# Patient Record
Sex: FEMALE | Race: OTHER | NOT HISPANIC OR LATINO | URBAN - METROPOLITAN AREA
[De-identification: names, ages, dates, MRNs, and addresses within clinical notes are randomized per-mention and may not be internally consistent; named-entity substitution may affect disease eponyms.]

---

## 2019-12-11 ENCOUNTER — EMERGENCY (EMERGENCY)
Facility: HOSPITAL | Age: 29
LOS: 1 days | Discharge: HOME | End: 2019-12-11
Attending: EMERGENCY MEDICINE
Payer: COMMERCIAL

## 2019-12-11 VITALS
HEART RATE: 120 BPM | SYSTOLIC BLOOD PRESSURE: 125 MMHG | DIASTOLIC BLOOD PRESSURE: 82 MMHG | OXYGEN SATURATION: 100 % | TEMPERATURE: 99 F | RESPIRATION RATE: 20 BRPM | HEIGHT: 59 IN | WEIGHT: 164.91 LBS

## 2019-12-11 VITALS
DIASTOLIC BLOOD PRESSURE: 72 MMHG | HEART RATE: 94 BPM | OXYGEN SATURATION: 97 % | SYSTOLIC BLOOD PRESSURE: 120 MMHG | RESPIRATION RATE: 20 BRPM | TEMPERATURE: 98 F

## 2019-12-11 LAB
ALBUMIN SERPL ELPH-MCNC: 3.1 G/DL — LOW (ref 3.5–5)
ALP SERPL-CCNC: 237 U/L — HIGH (ref 40–120)
ALT FLD-CCNC: 389 U/L DA — HIGH (ref 10–60)
ANION GAP SERPL CALC-SCNC: 6 MMOL/L — SIGNIFICANT CHANGE UP (ref 5–17)
APPEARANCE UR: CLEAR — SIGNIFICANT CHANGE UP
AST SERPL-CCNC: 183 U/L — HIGH (ref 10–40)
BILIRUB SERPL-MCNC: 0.5 MG/DL — SIGNIFICANT CHANGE UP (ref 0.2–1.2)
BILIRUB UR-MCNC: NEGATIVE — SIGNIFICANT CHANGE UP
BUN SERPL-MCNC: 3 MG/DL — LOW (ref 7–18)
CALCIUM SERPL-MCNC: 8.5 MG/DL — SIGNIFICANT CHANGE UP (ref 8.4–10.5)
CHLORIDE SERPL-SCNC: 101 MMOL/L — SIGNIFICANT CHANGE UP (ref 96–108)
CO2 SERPL-SCNC: 24 MMOL/L — SIGNIFICANT CHANGE UP (ref 22–31)
COLOR SPEC: YELLOW — SIGNIFICANT CHANGE UP
CREAT SERPL-MCNC: 0.58 MG/DL — SIGNIFICANT CHANGE UP (ref 0.5–1.3)
DIFF PNL FLD: ABNORMAL
FLU A RESULT: SIGNIFICANT CHANGE UP
FLU A RESULT: SIGNIFICANT CHANGE UP
FLUAV AG NPH QL: SIGNIFICANT CHANGE UP
FLUBV AG NPH QL: SIGNIFICANT CHANGE UP
GLUCOSE SERPL-MCNC: 98 MG/DL — SIGNIFICANT CHANGE UP (ref 70–99)
GLUCOSE UR QL: NEGATIVE — SIGNIFICANT CHANGE UP
HCG SERPL-ACNC: HIGH MIU/ML
HCT VFR BLD CALC: 39.4 % — SIGNIFICANT CHANGE UP (ref 34.5–45)
HGB BLD-MCNC: 12.5 G/DL — SIGNIFICANT CHANGE UP (ref 11.5–15.5)
KETONES UR-MCNC: NEGATIVE — SIGNIFICANT CHANGE UP
LEUKOCYTE ESTERASE UR-ACNC: ABNORMAL
MCHC RBC-ENTMCNC: 24.6 PG — LOW (ref 27–34)
MCHC RBC-ENTMCNC: 31.7 GM/DL — LOW (ref 32–36)
MCV RBC AUTO: 77.4 FL — LOW (ref 80–100)
NITRITE UR-MCNC: NEGATIVE — SIGNIFICANT CHANGE UP
NRBC # BLD: 0 /100 WBCS — SIGNIFICANT CHANGE UP (ref 0–0)
PH UR: 7 — SIGNIFICANT CHANGE UP (ref 5–8)
PLATELET # BLD AUTO: 234 K/UL — SIGNIFICANT CHANGE UP (ref 150–400)
POTASSIUM SERPL-MCNC: 3.8 MMOL/L — SIGNIFICANT CHANGE UP (ref 3.5–5.3)
POTASSIUM SERPL-SCNC: 3.8 MMOL/L — SIGNIFICANT CHANGE UP (ref 3.5–5.3)
PROT SERPL-MCNC: 7.8 G/DL — SIGNIFICANT CHANGE UP (ref 6–8.3)
PROT UR-MCNC: NEGATIVE — SIGNIFICANT CHANGE UP
RBC # BLD: 5.09 M/UL — SIGNIFICANT CHANGE UP (ref 3.8–5.2)
RBC # FLD: 15.1 % — HIGH (ref 10.3–14.5)
RSV RESULT: SIGNIFICANT CHANGE UP
RSV RNA RESP QL NAA+PROBE: SIGNIFICANT CHANGE UP
SODIUM SERPL-SCNC: 131 MMOL/L — LOW (ref 135–145)
SP GR SPEC: 1 — LOW (ref 1.01–1.02)
UROBILINOGEN FLD QL: NEGATIVE — SIGNIFICANT CHANGE UP
WBC # BLD: 8.04 K/UL — SIGNIFICANT CHANGE UP (ref 3.8–10.5)
WBC # FLD AUTO: 8.04 K/UL — SIGNIFICANT CHANGE UP (ref 3.8–10.5)

## 2019-12-11 PROCEDURE — 96374 THER/PROPH/DIAG INJ IV PUSH: CPT

## 2019-12-11 PROCEDURE — 99284 EMERGENCY DEPT VISIT MOD MDM: CPT | Mod: 25

## 2019-12-11 PROCEDURE — 76801 OB US < 14 WKS SINGLE FETUS: CPT

## 2019-12-11 PROCEDURE — 85027 COMPLETE CBC AUTOMATED: CPT

## 2019-12-11 PROCEDURE — 84702 CHORIONIC GONADOTROPIN TEST: CPT

## 2019-12-11 PROCEDURE — 99284 EMERGENCY DEPT VISIT MOD MDM: CPT

## 2019-12-11 PROCEDURE — 81001 URINALYSIS AUTO W/SCOPE: CPT

## 2019-12-11 PROCEDURE — 76801 OB US < 14 WKS SINGLE FETUS: CPT | Mod: 26

## 2019-12-11 PROCEDURE — 80053 COMPREHEN METABOLIC PANEL: CPT

## 2019-12-11 PROCEDURE — 87631 RESP VIRUS 3-5 TARGETS: CPT

## 2019-12-11 PROCEDURE — 36415 COLL VENOUS BLD VENIPUNCTURE: CPT

## 2019-12-11 RX ORDER — ACETAMINOPHEN 500 MG
975 TABLET ORAL ONCE
Refills: 0 | Status: COMPLETED | OUTPATIENT
Start: 2019-12-11 | End: 2019-12-11

## 2019-12-11 RX ORDER — METOCLOPRAMIDE HCL 10 MG
10 TABLET ORAL ONCE
Refills: 0 | Status: COMPLETED | OUTPATIENT
Start: 2019-12-11 | End: 2019-12-11

## 2019-12-11 RX ORDER — IBUPROFEN 200 MG
800 TABLET ORAL ONCE
Refills: 0 | Status: DISCONTINUED | OUTPATIENT
Start: 2019-12-11 | End: 2019-12-11

## 2019-12-11 RX ORDER — NITROFURANTOIN MACROCRYSTAL 50 MG
1 CAPSULE ORAL
Qty: 14 | Refills: 0
Start: 2019-12-11

## 2019-12-11 RX ORDER — SODIUM CHLORIDE 9 MG/ML
1000 INJECTION INTRAMUSCULAR; INTRAVENOUS; SUBCUTANEOUS ONCE
Refills: 0 | Status: COMPLETED | OUTPATIENT
Start: 2019-12-11 | End: 2019-12-11

## 2019-12-11 RX ADMIN — Medication 10 MILLIGRAM(S): at 14:53

## 2019-12-11 RX ADMIN — SODIUM CHLORIDE 1000 MILLILITER(S): 9 INJECTION INTRAMUSCULAR; INTRAVENOUS; SUBCUTANEOUS at 17:26

## 2019-12-11 RX ADMIN — Medication 975 MILLIGRAM(S): at 17:25

## 2019-12-11 RX ADMIN — SODIUM CHLORIDE 1000 MILLILITER(S): 9 INJECTION INTRAMUSCULAR; INTRAVENOUS; SUBCUTANEOUS at 14:52

## 2019-12-11 NOTE — ED PROVIDER NOTE - PHYSICAL EXAMINATION
General: pt lying in stretcher, appears stated age and is not in distress  HEENT: AT/NC, pink conjunctiva, anicteric sclerae, EOMI, PERRLA, TMs smooth, grey, intact, with normal landmarks, nasal mucosa pink, no discharge, turbinates not enlarged; moist mucus membranes, tongue well-papillated, good dentition; posterior pharynx shows no erythema or exudates;   Neck: supple, full ROM, trachea midline, no JVD, no cervical LAD, no midline ttp or stepoffs  Lungs: symmetric excursion, b/l clear vesicular breath sounds with no wheezes, crackles, or rhonchi  Heart: rrr, S1, S2 normal; no S3 or S4; no murmurs or rubs  Abd: normal bowel sounds; soft, nontender; negative McBurney's point tenderness, negative Abreu's sign, no rebound, no guarding, spleen non-palpable; no hepatomegaly, no masses  Back: no midline spinal tenderness or stepoffs, no costovertebral angle tenderness  Extremities: no clubbing, cyanosis, or edema; no palpable deformities or fractures  Skin: good turgor; no rashes, petechiae, ecchymoses, or jaundice  Pulses: radial, posterior tibialis (PT), dorsalis pedis (DP) all 2+ & symmetric  Neuro: awake, alert, responsive; oriented to person, place and time; cranial nerves intact, EOMI, intact jaw movement, intact facial sensation, no facial asymmetry, hearing intact; no nystagmus, tongue midline; Motor: Normal tone in upper and lower extremities bilaterally strength 5/5; Sensory: intact to pinprick and light touch; Cerebellar: finger-to-nose intact; normal steady gait; negative Romberg’s sign; DTR: biceps, triceps, patellar, 2+, no pronator drift General: pt lying in stretcher, appears stated age and is not in distress, speaking in full clear sentences  HEENT: AT/NC, pink conjunctiva, anicteric sclerae, EOMI, PERRLA, moist mucus membranes, tongue well-papillated, good dentition; posterior pharynx shows no erythema or exudates;   Neck: supple, full ROM, trachea midline, no JVD, no cervical LAD, no midline ttp or stepoffs  Lungs: symmetric excursion, b/l clear vesicular breath sounds with no wheezes, crackles, or rhonchi  Heart: rrr, S1, S2 normal; no S3 or S4; no murmurs or rubs  Abd: normal bowel sounds; soft, nontender; negative McBurney's point tenderness, negative Abreu's sign, no rebound, no guarding, spleen non-palpable; no hepatomegaly, no masses  Back: no midline spinal tenderness or stepoffs, no costovertebral angle tenderness  Extremities: no clubbing, cyanosis, or edema; no palpable deformities or fractures  Skin: good turgor; no rashes, petechiae, ecchymoses, or jaundice  Pulses: radial, posterior tibialis (PT), dorsalis pedis (DP) all 2+ & symmetric  Neuro: awake, alert, responsive; oriented to person, place and time; cranial nerves intact, EOMI, intact jaw movement, intact facial sensation, no facial asymmetry, hearing intact; no nystagmus, tongue midline; Motor: Normal tone in upper and lower extremities bilaterally strength 5/5; Sensory: intact; normal steady gait

## 2019-12-11 NOTE — ED PROVIDER NOTE - PATIENT PORTAL LINK FT
You can access the FollowMyHealth Patient Portal offered by [facility] by registering at the following website: [email or web address]. By joining [employer]s FollowMyHealth portal, you will also be able to view your health information using other applications (apps) compatible with our system.

## 2019-12-11 NOTE — ED PROVIDER NOTE - CLINICAL SUMMARY MEDICAL DECISION MAKING FREE TEXT BOX
Pt w/ aforementioned presentation afebrile in the ED (last tylenol dose was yesterday) concerning for but not limited to viral illness, UTI other infection; lungs clear to auscultation. No neuro deficits or nuchal rigidity to suggest meningitis. Abd presently w/ no ttp and no abdominal pain. Will get labs, US, give IVF, monitor and reassess.   Pt improved w/ meds and IVF. Has positive UA but no CVA tenderness to suggest pyelonephritis or nephrolithiasis. flu negative. Will dc home w/ instructions to follow up w/ PMD. normal IUP on US.

## 2019-12-11 NOTE — ED PROVIDER NOTE - OBJECTIVE STATEMENT
30 y/o F with a PMHx of Migraines and no PSHx presents to ED 8 weeks pregnant. Pt is complaining of migraines with intermittent fevers that get worse at night. Pt went to PCP was told likely viral illness, negative flu. Pt took Tylenol at 5am x today. Today, new onset of abd pain. Denies neck stiffness/focal weakness/numbness. Headache has improved. Pt has no other     complaints. NKDA. 28 y/o F with a PMHx of Migraines and no PSHx presents to ED  (1 ) [place] 8 weeks pregnant. Pt is complaining of her usual migraine with intermittent fevers that get worse at night. Pt went to PCP was told likely viral illness, negative flu. Pt took Tylenol at 5am x today. Today, new onset of low abd pain at home but spontaneously resolved and no pain presently in the ED. Denies vaginal bleeding, neck stiffness/focal weakness/numbness. Headache has improved. Pt has no other complaints. NKDA.

## 2019-12-23 PROBLEM — G43.909 MIGRAINE, UNSPECIFIED, NOT INTRACTABLE, WITHOUT STATUS MIGRAINOSUS: Chronic | Status: ACTIVE | Noted: 2019-12-11

## 2020-01-08 ENCOUNTER — APPOINTMENT (OUTPATIENT)
Dept: ANTEPARTUM | Facility: CLINIC | Age: 30
End: 2020-01-08
Payer: COMMERCIAL

## 2020-01-08 ENCOUNTER — ASOB RESULT (OUTPATIENT)
Age: 30
End: 2020-01-08

## 2020-01-08 PROBLEM — Z00.00 ENCOUNTER FOR PREVENTIVE HEALTH EXAMINATION: Status: ACTIVE | Noted: 2020-01-08

## 2020-01-08 PROCEDURE — 76813 OB US NUCHAL MEAS 1 GEST: CPT

## 2020-01-08 PROCEDURE — 76801 OB US < 14 WKS SINGLE FETUS: CPT

## 2020-01-08 PROCEDURE — 36416 COLLJ CAPILLARY BLOOD SPEC: CPT

## 2020-01-28 ENCOUNTER — EMERGENCY (EMERGENCY)
Facility: HOSPITAL | Age: 30
LOS: 1 days | Discharge: HOME | End: 2020-01-28
Attending: EMERGENCY MEDICINE
Payer: COMMERCIAL

## 2020-01-28 VITALS
OXYGEN SATURATION: 100 % | HEART RATE: 72 BPM | TEMPERATURE: 97 F | SYSTOLIC BLOOD PRESSURE: 112 MMHG | RESPIRATION RATE: 18 BRPM | DIASTOLIC BLOOD PRESSURE: 70 MMHG

## 2020-01-28 VITALS
WEIGHT: 164.91 LBS | SYSTOLIC BLOOD PRESSURE: 117 MMHG | HEIGHT: 59 IN | DIASTOLIC BLOOD PRESSURE: 71 MMHG | OXYGEN SATURATION: 100 % | HEART RATE: 70 BPM | RESPIRATION RATE: 16 BRPM | TEMPERATURE: 98 F

## 2020-01-28 LAB
ALBUMIN SERPL ELPH-MCNC: 3.2 G/DL — LOW (ref 3.5–5)
ALP SERPL-CCNC: 92 U/L — SIGNIFICANT CHANGE UP (ref 40–120)
ALT FLD-CCNC: 36 U/L DA — SIGNIFICANT CHANGE UP (ref 10–60)
ANION GAP SERPL CALC-SCNC: 6 MMOL/L — SIGNIFICANT CHANGE UP (ref 5–17)
APPEARANCE UR: CLEAR — SIGNIFICANT CHANGE UP
AST SERPL-CCNC: 14 U/L — SIGNIFICANT CHANGE UP (ref 10–40)
BASOPHILS # BLD AUTO: 0.02 K/UL — SIGNIFICANT CHANGE UP (ref 0–0.2)
BASOPHILS NFR BLD AUTO: 0.2 % — SIGNIFICANT CHANGE UP (ref 0–2)
BILIRUB SERPL-MCNC: 0.4 MG/DL — SIGNIFICANT CHANGE UP (ref 0.2–1.2)
BILIRUB UR-MCNC: NEGATIVE — SIGNIFICANT CHANGE UP
BUN SERPL-MCNC: 7 MG/DL — SIGNIFICANT CHANGE UP (ref 7–18)
CALCIUM SERPL-MCNC: 8.8 MG/DL — SIGNIFICANT CHANGE UP (ref 8.4–10.5)
CHLORIDE SERPL-SCNC: 103 MMOL/L — SIGNIFICANT CHANGE UP (ref 96–108)
CO2 SERPL-SCNC: 26 MMOL/L — SIGNIFICANT CHANGE UP (ref 22–31)
COLOR SPEC: YELLOW — SIGNIFICANT CHANGE UP
CREAT SERPL-MCNC: 0.5 MG/DL — SIGNIFICANT CHANGE UP (ref 0.5–1.3)
DIFF PNL FLD: NEGATIVE — SIGNIFICANT CHANGE UP
EOSINOPHIL # BLD AUTO: 0.14 K/UL — SIGNIFICANT CHANGE UP (ref 0–0.5)
EOSINOPHIL NFR BLD AUTO: 1.7 % — SIGNIFICANT CHANGE UP (ref 0–6)
GLUCOSE SERPL-MCNC: 93 MG/DL — SIGNIFICANT CHANGE UP (ref 70–99)
GLUCOSE UR QL: NEGATIVE — SIGNIFICANT CHANGE UP
HCT VFR BLD CALC: 34.8 % — SIGNIFICANT CHANGE UP (ref 34.5–45)
HGB BLD-MCNC: 11.2 G/DL — LOW (ref 11.5–15.5)
IMM GRANULOCYTES NFR BLD AUTO: 0.6 % — SIGNIFICANT CHANGE UP (ref 0–1.5)
KETONES UR-MCNC: NEGATIVE — SIGNIFICANT CHANGE UP
LEUKOCYTE ESTERASE UR-ACNC: ABNORMAL
LIDOCAIN IGE QN: 110 U/L — SIGNIFICANT CHANGE UP (ref 73–393)
LYMPHOCYTES # BLD AUTO: 2.35 K/UL — SIGNIFICANT CHANGE UP (ref 1–3.3)
LYMPHOCYTES # BLD AUTO: 28.2 % — SIGNIFICANT CHANGE UP (ref 13–44)
MCHC RBC-ENTMCNC: 25.8 PG — LOW (ref 27–34)
MCHC RBC-ENTMCNC: 32.2 GM/DL — SIGNIFICANT CHANGE UP (ref 32–36)
MCV RBC AUTO: 80.2 FL — SIGNIFICANT CHANGE UP (ref 80–100)
MONOCYTES # BLD AUTO: 0.62 K/UL — SIGNIFICANT CHANGE UP (ref 0–0.9)
MONOCYTES NFR BLD AUTO: 7.5 % — SIGNIFICANT CHANGE UP (ref 2–14)
NEUTROPHILS # BLD AUTO: 5.14 K/UL — SIGNIFICANT CHANGE UP (ref 1.8–7.4)
NEUTROPHILS NFR BLD AUTO: 61.8 % — SIGNIFICANT CHANGE UP (ref 43–77)
NITRITE UR-MCNC: NEGATIVE — SIGNIFICANT CHANGE UP
NRBC # BLD: 0 /100 WBCS — SIGNIFICANT CHANGE UP (ref 0–0)
PH UR: 6.5 — SIGNIFICANT CHANGE UP (ref 5–8)
PLATELET # BLD AUTO: 290 K/UL — SIGNIFICANT CHANGE UP (ref 150–400)
POTASSIUM SERPL-MCNC: 3.8 MMOL/L — SIGNIFICANT CHANGE UP (ref 3.5–5.3)
POTASSIUM SERPL-SCNC: 3.8 MMOL/L — SIGNIFICANT CHANGE UP (ref 3.5–5.3)
PROT SERPL-MCNC: 7.6 G/DL — SIGNIFICANT CHANGE UP (ref 6–8.3)
PROT UR-MCNC: NEGATIVE — SIGNIFICANT CHANGE UP
RBC # BLD: 4.34 M/UL — SIGNIFICANT CHANGE UP (ref 3.8–5.2)
RBC # FLD: 15.1 % — HIGH (ref 10.3–14.5)
SODIUM SERPL-SCNC: 135 MMOL/L — SIGNIFICANT CHANGE UP (ref 135–145)
SP GR SPEC: 1.01 — SIGNIFICANT CHANGE UP (ref 1.01–1.02)
UROBILINOGEN FLD QL: NEGATIVE — SIGNIFICANT CHANGE UP
WBC # BLD: 8.32 K/UL — SIGNIFICANT CHANGE UP (ref 3.8–10.5)
WBC # FLD AUTO: 8.32 K/UL — SIGNIFICANT CHANGE UP (ref 3.8–10.5)

## 2020-01-28 PROCEDURE — 85027 COMPLETE CBC AUTOMATED: CPT

## 2020-01-28 PROCEDURE — 99284 EMERGENCY DEPT VISIT MOD MDM: CPT | Mod: 25

## 2020-01-28 PROCEDURE — 10060 I&D ABSCESS SIMPLE/SINGLE: CPT

## 2020-01-28 PROCEDURE — 96374 THER/PROPH/DIAG INJ IV PUSH: CPT | Mod: XU

## 2020-01-28 PROCEDURE — 80053 COMPREHEN METABOLIC PANEL: CPT

## 2020-01-28 PROCEDURE — 36415 COLL VENOUS BLD VENIPUNCTURE: CPT

## 2020-01-28 PROCEDURE — 99284 EMERGENCY DEPT VISIT MOD MDM: CPT

## 2020-01-28 PROCEDURE — 83690 ASSAY OF LIPASE: CPT

## 2020-01-28 PROCEDURE — 81001 URINALYSIS AUTO W/SCOPE: CPT

## 2020-01-28 PROCEDURE — 87086 URINE CULTURE/COLONY COUNT: CPT

## 2020-01-28 RX ORDER — CEPHALEXIN 500 MG
1 CAPSULE ORAL
Qty: 14 | Refills: 0
Start: 2020-01-28 | End: 2020-02-03

## 2020-01-28 RX ORDER — SODIUM CHLORIDE 9 MG/ML
1000 INJECTION INTRAMUSCULAR; INTRAVENOUS; SUBCUTANEOUS ONCE
Refills: 0 | Status: COMPLETED | OUTPATIENT
Start: 2020-01-28 | End: 2020-01-28

## 2020-01-28 RX ORDER — ONDANSETRON 8 MG/1
4 TABLET, FILM COATED ORAL ONCE
Refills: 0 | Status: COMPLETED | OUTPATIENT
Start: 2020-01-28 | End: 2020-01-28

## 2020-01-28 RX ADMIN — SODIUM CHLORIDE 2000 MILLILITER(S): 9 INJECTION INTRAMUSCULAR; INTRAVENOUS; SUBCUTANEOUS at 16:27

## 2020-01-28 RX ADMIN — ONDANSETRON 4 MILLIGRAM(S): 8 TABLET, FILM COATED ORAL at 16:27

## 2020-01-28 NOTE — ED PROVIDER NOTE - OBJECTIVE STATEMENT
28 y/o F pt who is A1 15 weeks pregnant with PMHx of UTI and pyelo while pregnant (previous pregnancy), and migraines, and no significant PSHx presents to ED c/o vomiting since yesterday, L armpit abscess x 3 days, and intermittent R lower back pain. Per pt, she's vomited twice yesterday afternoon, did not vomit last night, and vomited 4x this morning, and has been unable to keep anything down. Pt relates having dizziness and headache with her vomiting, a low grade fever of 100.7 last night that's resolved, being unable to sleep, and having frequent urination that looks cloudy. Pt states she went to her OB earlier today who told her to come to ED because of concerns for appendicitis, cholecystitis, and pyelonephritis. Pt attributes back pain to muscular strain stating she works on her feet all day. Pt is on pre- meds and reports her last sono was at 12weeks gestation and everything looked normal. Pt denies abdominal pain, hematuria, burning urination, vaginal bleeding, and any other acute complaints.

## 2020-01-28 NOTE — ED PROVIDER NOTE - CARE PLAN
no Principal Discharge DX:	Abscess of axilla, left  Secondary Diagnosis:	Asymptomatic bacteriuria during pregnancy

## 2020-01-28 NOTE — ED ADULT NURSE NOTE - OBJECTIVE STATEMENT
pt is a 28 y/o  female w/ c/o back pain  states she is 15 weeks pregnancy  and w/ history of   UTI. + vomiting since yesterday  and w/ lt armpit abscess.

## 2020-01-28 NOTE — ED PROVIDER NOTE - CLINICAL SUMMARY MEDICAL DECISION MAKING FREE TEXT BOX
30 y/o F pt who is 15 weeks in gestation presents to ED with L axilla abscess, multiple episodes of vomiting, and lower back pain. Concern for UTI, not clinically likely to be appendicitis. Not likely to be acute cholecystitis given no pain at this time, discussed if pain redevelops that there may be need to reconsider imaging. Will order blood work, give Zofran, and PO challenge.

## 2020-01-28 NOTE — ED ADULT TRIAGE NOTE - CHIEF COMPLAINT QUOTE
c/O 15 WEEKS PREGNANT SENT BY GYN FOR N/V, RIGHT LOWER BACK PAIN/ON/OFF R/O PYELONEPHRITIS, CHOLECYSTITIS, APPENDICITIS PER PAPERWORK WITH PT. Pt reports " I also have an abscess in my left arm pit".

## 2020-01-28 NOTE — ED ADULT NURSE REASSESSMENT NOTE - NS ED NURSE REASSESS COMMENT FT1
1900 - pt completed  incision and drainage done by Md Vasquez. pt tolerated procedure well.
1630 pm - pt felt better after the medication . no active nausea and vomiting in the ED intake area.

## 2020-01-28 NOTE — ED PROVIDER NOTE - PATIENT PORTAL LINK FT
You can access the FollowMyHealth Patient Portal offered by [facility] by registering at the following website: [email or web address]. By joining [employer]’s FollowMyHealth portal, you will also be able to view your health information using other applications (apps) compatible with our system.

## 2020-01-28 NOTE — ED PROVIDER NOTE - NSFOLLOWUPINSTRUCTIONS_ED_ALL_ED_FT
Follow up with the primary care doctor in 1-2 days.    Follow up with the OBGYN within 1 week.    Warm compress to L armpit. When you shower remove the dressing and wash with soap and water. Cover the area until crusts over and there is no leakage anymore.    You have been given the prescription for the antibiotic KEFLEX (Cephalexin). Side effects of the medication may include: abdominal pain, diarrhea, rash, yeast infection (in women). If you have any concern about the side effects of the medication speak with your doctor.     If you experience any new or worsening symptoms or if you are concerned you can always come back to the emergency for a re-evaluation.    If there were any prescriptions given to you during the visit today take them as prescribed. If you have any questions you can ask the pharmacist.

## 2020-01-28 NOTE — ED PROVIDER NOTE - PROGRESS NOTE DETAILS
UA shows bacteria. Will give Keflex to cover urine infection and skin mirna re the abscess. Will need to follow up with PMD in 1-2 days and OBGYN within 1 week. Pt is well appearing walking with steady gait, stable for discharge and follow up without fail with medical doctor. I had a detailed discussion with the patient and/or guardian regarding the historical points, exam findings, and any diagnostic results supporting the discharge diagnosis. Pt educated on care and need for follow up. Strict return instructions and red flag signs and symptoms discussed with patient. Questions answered. Pt shows understanding of discharge information and agrees to follow. did not have any abdominal pain / ttp on dc/ jayson meal tray. not vomiting.

## 2020-01-29 LAB
CULTURE RESULTS: SIGNIFICANT CHANGE UP
SPECIMEN SOURCE: SIGNIFICANT CHANGE UP

## 2020-03-02 ENCOUNTER — APPOINTMENT (OUTPATIENT)
Dept: ANTEPARTUM | Facility: CLINIC | Age: 30
End: 2020-03-02
Payer: MEDICAID

## 2020-03-02 ENCOUNTER — ASOB RESULT (OUTPATIENT)
Age: 30
End: 2020-03-02

## 2020-03-02 PROCEDURE — 76811 OB US DETAILED SNGL FETUS: CPT

## 2020-03-02 PROCEDURE — 76817 TRANSVAGINAL US OBSTETRIC: CPT

## 2020-04-01 PROBLEM — O23.00: Chronic | Status: ACTIVE | Noted: 2020-01-28

## 2020-04-01 PROBLEM — O23.40 UNSPECIFIED INFECTION OF URINARY TRACT IN PREGNANCY, UNSPECIFIED TRIMESTER: Chronic | Status: ACTIVE | Noted: 2020-01-28

## 2020-04-27 ENCOUNTER — ASOB RESULT (OUTPATIENT)
Age: 30
End: 2020-04-27

## 2020-04-27 ENCOUNTER — APPOINTMENT (OUTPATIENT)
Dept: ANTEPARTUM | Facility: CLINIC | Age: 30
End: 2020-04-27
Payer: MEDICAID

## 2020-04-27 PROCEDURE — 76816 OB US FOLLOW-UP PER FETUS: CPT

## 2020-06-17 ENCOUNTER — ASOB RESULT (OUTPATIENT)
Age: 30
End: 2020-06-17

## 2020-06-17 ENCOUNTER — APPOINTMENT (OUTPATIENT)
Dept: ANTEPARTUM | Facility: CLINIC | Age: 30
End: 2020-06-17
Payer: MEDICAID

## 2020-06-17 PROCEDURE — 76816 OB US FOLLOW-UP PER FETUS: CPT

## 2020-07-14 ENCOUNTER — INPATIENT (INPATIENT)
Facility: HOSPITAL | Age: 30
LOS: 1 days | Discharge: HOME | End: 2020-07-16
Attending: OBSTETRICS & GYNECOLOGY | Admitting: OBSTETRICS & GYNECOLOGY
Payer: MEDICAID

## 2020-07-14 ENCOUNTER — RESULT REVIEW (OUTPATIENT)
Age: 30
End: 2020-07-14

## 2020-07-14 VITALS — HEIGHT: 59 IN | WEIGHT: 180.78 LBS

## 2020-07-14 DIAGNOSIS — Z3A.00 WEEKS OF GESTATION OF PREGNANCY NOT SPECIFIED: ICD-10-CM

## 2020-07-14 DIAGNOSIS — Z34.80 ENCOUNTER FOR SUPERVISION OF OTHER NORMAL PREGNANCY, UNSPECIFIED TRIMESTER: ICD-10-CM

## 2020-07-14 DIAGNOSIS — O26.899 OTHER SPECIFIED PREGNANCY RELATED CONDITIONS, UNSPECIFIED TRIMESTER: ICD-10-CM

## 2020-07-14 LAB
APTT BLD: 32.2 SEC — SIGNIFICANT CHANGE UP (ref 27.5–35.5)
BASOPHILS # BLD AUTO: 0.02 K/UL — SIGNIFICANT CHANGE UP (ref 0–0.2)
BASOPHILS NFR BLD AUTO: 0.2 % — SIGNIFICANT CHANGE UP (ref 0–2)
BLD GP AB SCN SERPL QL: SIGNIFICANT CHANGE UP
EOSINOPHIL # BLD AUTO: 0.06 K/UL — SIGNIFICANT CHANGE UP (ref 0–0.5)
EOSINOPHIL NFR BLD AUTO: 0.5 % — SIGNIFICANT CHANGE UP (ref 0–6)
FIBRINOGEN PPP-MCNC: 1146 MG/DL — HIGH (ref 350–510)
HCT VFR BLD CALC: 37.6 % — SIGNIFICANT CHANGE UP (ref 34.5–45)
HGB BLD-MCNC: 11.8 G/DL — SIGNIFICANT CHANGE UP (ref 11.5–15.5)
IMM GRANULOCYTES NFR BLD AUTO: 0.4 % — SIGNIFICANT CHANGE UP (ref 0–1.5)
INR BLD: 1.02 RATIO — SIGNIFICANT CHANGE UP (ref 0.88–1.16)
LYMPHOCYTES # BLD AUTO: 1.88 K/UL — SIGNIFICANT CHANGE UP (ref 1–3.3)
LYMPHOCYTES # BLD AUTO: 15 % — SIGNIFICANT CHANGE UP (ref 13–44)
MCHC RBC-ENTMCNC: 24.2 PG — LOW (ref 27–34)
MCHC RBC-ENTMCNC: 31.4 GM/DL — LOW (ref 32–36)
MCV RBC AUTO: 77.2 FL — LOW (ref 80–100)
MONOCYTES # BLD AUTO: 0.78 K/UL — SIGNIFICANT CHANGE UP (ref 0–0.9)
MONOCYTES NFR BLD AUTO: 6.2 % — SIGNIFICANT CHANGE UP (ref 2–14)
NEUTROPHILS # BLD AUTO: 9.78 K/UL — HIGH (ref 1.8–7.4)
NEUTROPHILS NFR BLD AUTO: 77.7 % — HIGH (ref 43–77)
NRBC # BLD: 0 /100 WBCS — SIGNIFICANT CHANGE UP (ref 0–0)
PLATELET # BLD AUTO: 284 K/UL — SIGNIFICANT CHANGE UP (ref 150–400)
PROTHROM AB SERPL-ACNC: 11.9 SEC — SIGNIFICANT CHANGE UP (ref 10.6–13.6)
RBC # BLD: 4.87 M/UL — SIGNIFICANT CHANGE UP (ref 3.8–5.2)
RBC # FLD: 16 % — HIGH (ref 10.3–14.5)
SARS-COV-2 RNA SPEC QL NAA+PROBE: SIGNIFICANT CHANGE UP
WBC # BLD: 12.57 K/UL — HIGH (ref 3.8–10.5)
WBC # FLD AUTO: 12.57 K/UL — HIGH (ref 3.8–10.5)

## 2020-07-14 PROCEDURE — 88307 TISSUE EXAM BY PATHOLOGIST: CPT | Mod: 26

## 2020-07-14 RX ORDER — SIMETHICONE 80 MG/1
80 TABLET, CHEWABLE ORAL EVERY 4 HOURS
Refills: 0 | Status: DISCONTINUED | OUTPATIENT
Start: 2020-07-14 | End: 2020-07-16

## 2020-07-14 RX ORDER — LANOLIN
1 OINTMENT (GRAM) TOPICAL EVERY 6 HOURS
Refills: 0 | Status: DISCONTINUED | OUTPATIENT
Start: 2020-07-14 | End: 2020-07-16

## 2020-07-14 RX ORDER — IBUPROFEN 200 MG
600 TABLET ORAL EVERY 6 HOURS
Refills: 0 | Status: COMPLETED | OUTPATIENT
Start: 2020-07-14 | End: 2021-06-12

## 2020-07-14 RX ORDER — FERROUS SULFATE 325(65) MG
325 TABLET ORAL THREE TIMES A DAY
Refills: 0 | Status: DISCONTINUED | OUTPATIENT
Start: 2020-07-14 | End: 2020-07-16

## 2020-07-14 RX ORDER — SODIUM CHLORIDE 9 MG/ML
1000 INJECTION, SOLUTION INTRAVENOUS
Refills: 0 | Status: DISCONTINUED | OUTPATIENT
Start: 2020-07-14 | End: 2020-07-14

## 2020-07-14 RX ORDER — HYDROCORTISONE 1 %
1 OINTMENT (GRAM) TOPICAL EVERY 6 HOURS
Refills: 0 | Status: DISCONTINUED | OUTPATIENT
Start: 2020-07-14 | End: 2020-07-16

## 2020-07-14 RX ORDER — BENZOCAINE 10 %
1 GEL (GRAM) MUCOUS MEMBRANE EVERY 6 HOURS
Refills: 0 | Status: DISCONTINUED | OUTPATIENT
Start: 2020-07-14 | End: 2020-07-16

## 2020-07-14 RX ORDER — DIPHENHYDRAMINE HCL 50 MG
25 CAPSULE ORAL EVERY 6 HOURS
Refills: 0 | Status: DISCONTINUED | OUTPATIENT
Start: 2020-07-14 | End: 2020-07-16

## 2020-07-14 RX ORDER — OXYTOCIN 10 UNIT/ML
333.33 VIAL (ML) INJECTION
Qty: 20 | Refills: 0 | Status: DISCONTINUED | OUTPATIENT
Start: 2020-07-14 | End: 2020-07-16

## 2020-07-14 RX ORDER — SODIUM CHLORIDE 9 MG/ML
3 INJECTION INTRAMUSCULAR; INTRAVENOUS; SUBCUTANEOUS EVERY 8 HOURS
Refills: 0 | Status: DISCONTINUED | OUTPATIENT
Start: 2020-07-14 | End: 2020-07-16

## 2020-07-14 RX ORDER — AER TRAVELER 0.5 G/1
1 SOLUTION RECTAL; TOPICAL EVERY 4 HOURS
Refills: 0 | Status: DISCONTINUED | OUTPATIENT
Start: 2020-07-14 | End: 2020-07-16

## 2020-07-14 RX ORDER — OXYCODONE HYDROCHLORIDE 5 MG/1
5 TABLET ORAL EVERY 4 HOURS
Refills: 0 | Status: DISCONTINUED | OUTPATIENT
Start: 2020-07-14 | End: 2020-07-16

## 2020-07-14 RX ORDER — KETOROLAC TROMETHAMINE 30 MG/ML
30 SYRINGE (ML) INJECTION ONCE
Refills: 0 | Status: DISCONTINUED | OUTPATIENT
Start: 2020-07-14 | End: 2020-07-14

## 2020-07-14 RX ORDER — TETANUS TOXOID, REDUCED DIPHTHERIA TOXOID AND ACELLULAR PERTUSSIS VACCINE, ADSORBED 5; 2.5; 8; 8; 2.5 [IU]/.5ML; [IU]/.5ML; UG/.5ML; UG/.5ML; UG/.5ML
0.5 SUSPENSION INTRAMUSCULAR ONCE
Refills: 0 | Status: DISCONTINUED | OUTPATIENT
Start: 2020-07-14 | End: 2020-07-16

## 2020-07-14 RX ORDER — MAGNESIUM HYDROXIDE 400 MG/1
30 TABLET, CHEWABLE ORAL
Refills: 0 | Status: DISCONTINUED | OUTPATIENT
Start: 2020-07-14 | End: 2020-07-16

## 2020-07-14 RX ORDER — ASCORBIC ACID 60 MG
500 TABLET,CHEWABLE ORAL THREE TIMES A DAY
Refills: 0 | Status: DISCONTINUED | OUTPATIENT
Start: 2020-07-14 | End: 2020-07-16

## 2020-07-14 RX ORDER — PRAMOXINE HYDROCHLORIDE 150 MG/15G
1 AEROSOL, FOAM RECTAL EVERY 4 HOURS
Refills: 0 | Status: DISCONTINUED | OUTPATIENT
Start: 2020-07-14 | End: 2020-07-16

## 2020-07-14 RX ORDER — ACETAMINOPHEN 500 MG
975 TABLET ORAL EVERY 6 HOURS
Refills: 0 | Status: DISCONTINUED | OUTPATIENT
Start: 2020-07-14 | End: 2020-07-16

## 2020-07-14 RX ORDER — CITRIC ACID/SODIUM CITRATE 300-500 MG
15 SOLUTION, ORAL ORAL EVERY 6 HOURS
Refills: 0 | Status: DISCONTINUED | OUTPATIENT
Start: 2020-07-14 | End: 2020-07-14

## 2020-07-14 RX ORDER — OXYTOCIN 10 UNIT/ML
333.33 VIAL (ML) INJECTION
Qty: 20 | Refills: 0 | Status: DISCONTINUED | OUTPATIENT
Start: 2020-07-14 | End: 2020-07-14

## 2020-07-14 RX ORDER — DIBUCAINE 1 %
1 OINTMENT (GRAM) RECTAL EVERY 6 HOURS
Refills: 0 | Status: DISCONTINUED | OUTPATIENT
Start: 2020-07-14 | End: 2020-07-16

## 2020-07-14 RX ORDER — OXYTOCIN 10 UNIT/ML
2 VIAL (ML) INJECTION
Qty: 30 | Refills: 0 | Status: DISCONTINUED | OUTPATIENT
Start: 2020-07-14 | End: 2020-07-14

## 2020-07-14 RX ADMIN — SODIUM CHLORIDE 125 MILLILITER(S): 9 INJECTION, SOLUTION INTRAVENOUS at 18:40

## 2020-07-14 RX ADMIN — Medication 1000 MILLIUNIT(S)/MIN: at 22:58

## 2020-07-14 RX ADMIN — Medication 30 MILLIGRAM(S): at 23:37

## 2020-07-15 ENCOUNTER — TRANSCRIPTION ENCOUNTER (OUTPATIENT)
Age: 30
End: 2020-07-15

## 2020-07-15 LAB
HCT VFR BLD CALC: 30.4 % — LOW (ref 34.5–45)
HGB BLD-MCNC: 9.8 G/DL — LOW (ref 11.5–15.5)
MCHC RBC-ENTMCNC: 24.1 PG — LOW (ref 27–34)
MCHC RBC-ENTMCNC: 32.2 GM/DL — SIGNIFICANT CHANGE UP (ref 32–36)
MCV RBC AUTO: 74.9 FL — LOW (ref 80–100)
NRBC # BLD: 0 /100 WBCS — SIGNIFICANT CHANGE UP (ref 0–0)
PLATELET # BLD AUTO: 268 K/UL — SIGNIFICANT CHANGE UP (ref 150–400)
RBC # BLD: 4.06 M/UL — SIGNIFICANT CHANGE UP (ref 3.8–5.2)
RBC # FLD: 15.7 % — HIGH (ref 10.3–14.5)
SARS-COV-2 IGG SERPL QL IA: NEGATIVE — SIGNIFICANT CHANGE UP
SARS-COV-2 IGM SERPL IA-ACNC: <3.8 AU/ML — SIGNIFICANT CHANGE UP
T PALLIDUM AB TITR SER: NEGATIVE — SIGNIFICANT CHANGE UP
WBC # BLD: 15.02 K/UL — HIGH (ref 3.8–10.5)
WBC # FLD AUTO: 15.02 K/UL — HIGH (ref 3.8–10.5)

## 2020-07-15 RX ORDER — IBUPROFEN 200 MG
600 TABLET ORAL EVERY 6 HOURS
Refills: 0 | Status: DISCONTINUED | OUTPATIENT
Start: 2020-07-15 | End: 2020-07-16

## 2020-07-15 RX ADMIN — Medication 325 MILLIGRAM(S): at 21:48

## 2020-07-15 RX ADMIN — Medication 1 TABLET(S): at 11:05

## 2020-07-15 RX ADMIN — Medication 325 MILLIGRAM(S): at 13:48

## 2020-07-15 RX ADMIN — SODIUM CHLORIDE 3 MILLILITER(S): 9 INJECTION INTRAMUSCULAR; INTRAVENOUS; SUBCUTANEOUS at 22:43

## 2020-07-15 RX ADMIN — Medication 500 MILLIGRAM(S): at 21:48

## 2020-07-15 RX ADMIN — Medication 30 MILLIGRAM(S): at 00:00

## 2020-07-15 RX ADMIN — Medication 500 MILLIGRAM(S): at 13:48

## 2020-07-15 RX ADMIN — MAGNESIUM HYDROXIDE 30 MILLILITER(S): 400 TABLET, CHEWABLE ORAL at 21:47

## 2020-07-15 RX ADMIN — Medication 975 MILLIGRAM(S): at 06:15

## 2020-07-15 RX ADMIN — Medication 975 MILLIGRAM(S): at 14:20

## 2020-07-15 RX ADMIN — Medication 500 MILLIGRAM(S): at 06:14

## 2020-07-15 RX ADMIN — SODIUM CHLORIDE 3 MILLILITER(S): 9 INJECTION INTRAMUSCULAR; INTRAVENOUS; SUBCUTANEOUS at 06:03

## 2020-07-15 RX ADMIN — Medication 325 MILLIGRAM(S): at 06:13

## 2020-07-15 RX ADMIN — Medication 975 MILLIGRAM(S): at 13:48

## 2020-07-15 RX ADMIN — Medication 975 MILLIGRAM(S): at 07:05

## 2020-07-15 NOTE — PROGRESS NOTE ADULT - PROBLEM SELECTOR PLAN 1
A/P:  PPD#1 s/p     -Continue pain management  -Encourage OOB and ambulation  -Check CBC  -Continue current care  -Plan for discharge tomorrow  -d/w dr. andressa oakes

## 2020-07-16 VITALS
HEART RATE: 91 BPM | SYSTOLIC BLOOD PRESSURE: 110 MMHG | TEMPERATURE: 98 F | RESPIRATION RATE: 16 BRPM | DIASTOLIC BLOOD PRESSURE: 75 MMHG | OXYGEN SATURATION: 100 %

## 2020-07-16 PROCEDURE — 85027 COMPLETE CBC AUTOMATED: CPT

## 2020-07-16 PROCEDURE — 88307 TISSUE EXAM BY PATHOLOGIST: CPT

## 2020-07-16 PROCEDURE — G0463: CPT

## 2020-07-16 PROCEDURE — 86850 RBC ANTIBODY SCREEN: CPT

## 2020-07-16 PROCEDURE — 86901 BLOOD TYPING SEROLOGIC RH(D): CPT

## 2020-07-16 PROCEDURE — 85384 FIBRINOGEN ACTIVITY: CPT

## 2020-07-16 PROCEDURE — 86769 SARS-COV-2 COVID-19 ANTIBODY: CPT

## 2020-07-16 PROCEDURE — 59025 FETAL NON-STRESS TEST: CPT

## 2020-07-16 PROCEDURE — 86780 TREPONEMA PALLIDUM: CPT

## 2020-07-16 PROCEDURE — 36415 COLL VENOUS BLD VENIPUNCTURE: CPT

## 2020-07-16 PROCEDURE — 85610 PROTHROMBIN TIME: CPT

## 2020-07-16 PROCEDURE — 59050 FETAL MONITOR W/REPORT: CPT

## 2020-07-16 PROCEDURE — 87635 SARS-COV-2 COVID-19 AMP PRB: CPT

## 2020-07-16 PROCEDURE — 86900 BLOOD TYPING SEROLOGIC ABO: CPT

## 2020-07-16 PROCEDURE — 85730 THROMBOPLASTIN TIME PARTIAL: CPT

## 2020-07-16 RX ORDER — IBUPROFEN 200 MG
1 TABLET ORAL
Qty: 20 | Refills: 0
Start: 2020-07-16 | End: 2020-07-20

## 2020-07-16 RX ORDER — ASCORBIC ACID 60 MG
1 TABLET,CHEWABLE ORAL
Qty: 30 | Refills: 0
Start: 2020-07-16 | End: 2020-08-14

## 2020-07-16 RX ORDER — ACETAMINOPHEN 500 MG
2 TABLET ORAL
Qty: 40 | Refills: 0
Start: 2020-07-16 | End: 2020-07-20

## 2020-07-16 RX ADMIN — Medication 325 MILLIGRAM(S): at 06:29

## 2020-07-16 RX ADMIN — Medication 975 MILLIGRAM(S): at 01:15

## 2020-07-16 RX ADMIN — Medication 500 MILLIGRAM(S): at 06:29

## 2020-07-16 RX ADMIN — Medication 600 MILLIGRAM(S): at 08:36

## 2020-07-16 RX ADMIN — SODIUM CHLORIDE 3 MILLILITER(S): 9 INJECTION INTRAMUSCULAR; INTRAVENOUS; SUBCUTANEOUS at 06:46

## 2020-07-16 RX ADMIN — Medication 975 MILLIGRAM(S): at 00:45

## 2020-07-16 NOTE — DISCHARGE NOTE OB - CARE PLAN
Principal Discharge DX:	 (normal spontaneous vaginal delivery)  Goal:	follow up with OB in 6 weeks for postpartum visit  Assessment and plan of treatment:	No sex, no pushing, no heavy lifting, no strenuous activity, Nothing per vagina x  6 weeks - no sex, tampons, douching, tub baths, etc. Continue breastfeeding.  Motrin as needed for pain. Follow up in office in 5-6 weeks for post partum check up. Please call for appt.

## 2020-07-16 NOTE — PROGRESS NOTE ADULT - PROBLEM SELECTOR PLAN 1
- Discharge home with instructions  -Follow up in office in 5-6 weeks for postpartum visit  -Breastfeeding encouraged   -d/w dr Shah

## 2020-07-16 NOTE — DISCHARGE NOTE OB - PATIENT PORTAL LINK FT
You can access the FollowMyHealth Patient Portal offered by [facility] by registering at the following website: [email or web address]. By joining [employer]’s FollowMyHealth portal, you will also be able to view your health information using other applications (apps) compatible with our system.

## 2020-07-16 NOTE — DISCHARGE NOTE OB - HOSPITAL COURSE
pt presented in early labor, now s/p  at 39w1d. found to be covid neg. post partum care and breastfeeding instructions provided. normal course of labor and delivery

## 2020-07-16 NOTE — DISCHARGE NOTE OB - MATERIALS PROVIDED
Breastfeeding Log/Discharge Medication Information for Patients and Families Pocket Guide/Back To Sleep Handout/Birth Certificate Instructions/Breastfeeding Guide and Packet/Letter of Medical Neccessity/[facility] [place] Screening Program/Guide to Postpartum Care/[facility] Hearing Screen Program/Shaken Baby Prevention Handout/[place]  Immunization Record/Vaccinations/Breastfeeding Mother’s Support Group Information

## 2020-07-16 NOTE — DISCHARGE NOTE OB - CARE PROVIDER_API CALL
Cj Shah  OBSTETRICS AND GYNECOLOGY  [address] [id number]  Phone: [phone]  Fax: [phone]  Follow Up Time:

## 2020-07-16 NOTE — DISCHARGE NOTE OB - PLAN OF CARE
follow up with OB in 6 weeks for postpartum visit No sex, no pushing, no heavy lifting, no strenuous activity, Nothing per vagina x  6 weeks - no sex, tampons, douching, tub baths, etc. Continue breastfeeding.  Motrin as needed for pain. Follow up in office in 5-6 weeks for post partum check up. Please call for appt.

## 2020-08-27 ENCOUNTER — INPATIENT (INPATIENT)
Facility: HOSPITAL | Age: 30
LOS: 4 days | Discharge: HOME | DRG: 418 | End: 2020-09-01
Attending: SURGERY | Admitting: SURGERY
Payer: MEDICAID

## 2020-08-27 VITALS
RESPIRATION RATE: 18 BRPM | HEIGHT: 57.09 IN | WEIGHT: 163.14 LBS | DIASTOLIC BLOOD PRESSURE: 63 MMHG | HEART RATE: 97 BPM | SYSTOLIC BLOOD PRESSURE: 113 MMHG | TEMPERATURE: 98 F

## 2020-08-27 DIAGNOSIS — K85.10 BILIARY ACUTE PANCREATITIS WITHOUT NECROSIS OR INFECTION: ICD-10-CM

## 2020-08-27 LAB
ALBUMIN SERPL ELPH-MCNC: 4 G/DL — SIGNIFICANT CHANGE UP (ref 3.5–5)
ALP SERPL-CCNC: 268 U/L — HIGH (ref 40–120)
ALT FLD-CCNC: 309 U/L DA — HIGH (ref 10–60)
ANION GAP SERPL CALC-SCNC: 5 MMOL/L — SIGNIFICANT CHANGE UP (ref 5–17)
APTT BLD: 34.5 SEC — SIGNIFICANT CHANGE UP (ref 27.5–35.5)
AST SERPL-CCNC: 411 U/L — HIGH (ref 10–40)
BASOPHILS # BLD AUTO: 0.03 K/UL — SIGNIFICANT CHANGE UP (ref 0–0.2)
BASOPHILS NFR BLD AUTO: 0.2 % — SIGNIFICANT CHANGE UP (ref 0–2)
BILIRUB SERPL-MCNC: 1.4 MG/DL — HIGH (ref 0.2–1.2)
BLD GP AB SCN SERPL QL: SIGNIFICANT CHANGE UP
BUN SERPL-MCNC: 14 MG/DL — SIGNIFICANT CHANGE UP (ref 7–18)
CALCIUM SERPL-MCNC: 9.5 MG/DL — SIGNIFICANT CHANGE UP (ref 8.4–10.5)
CHLORIDE SERPL-SCNC: 105 MMOL/L — SIGNIFICANT CHANGE UP (ref 96–108)
CO2 SERPL-SCNC: 30 MMOL/L — SIGNIFICANT CHANGE UP (ref 22–31)
CREAT SERPL-MCNC: 1.01 MG/DL — SIGNIFICANT CHANGE UP (ref 0.5–1.3)
EOSINOPHIL # BLD AUTO: 0.08 K/UL — SIGNIFICANT CHANGE UP (ref 0–0.5)
EOSINOPHIL NFR BLD AUTO: 0.6 % — SIGNIFICANT CHANGE UP (ref 0–6)
GLUCOSE SERPL-MCNC: 118 MG/DL — HIGH (ref 70–99)
HCT VFR BLD CALC: 39.5 % — SIGNIFICANT CHANGE UP (ref 34.5–45)
HGB BLD-MCNC: 12.2 G/DL — SIGNIFICANT CHANGE UP (ref 11.5–15.5)
IMM GRANULOCYTES NFR BLD AUTO: 0.4 % — SIGNIFICANT CHANGE UP (ref 0–1.5)
INR BLD: 1.09 RATIO — SIGNIFICANT CHANGE UP (ref 0.88–1.16)
LIDOCAIN IGE QN: SIGNIFICANT CHANGE UP U/L (ref 73–393)
LYMPHOCYTES # BLD AUTO: 18.6 % — SIGNIFICANT CHANGE UP (ref 13–44)
LYMPHOCYTES # BLD AUTO: 2.56 K/UL — SIGNIFICANT CHANGE UP (ref 1–3.3)
MCHC RBC-ENTMCNC: 23.3 PG — LOW (ref 27–34)
MCHC RBC-ENTMCNC: 30.9 GM/DL — LOW (ref 32–36)
MCV RBC AUTO: 75.5 FL — LOW (ref 80–100)
MONOCYTES # BLD AUTO: 1.03 K/UL — HIGH (ref 0–0.9)
MONOCYTES NFR BLD AUTO: 7.5 % — SIGNIFICANT CHANGE UP (ref 2–14)
NEUTROPHILS # BLD AUTO: 10.01 K/UL — HIGH (ref 1.8–7.4)
NEUTROPHILS NFR BLD AUTO: 72.7 % — SIGNIFICANT CHANGE UP (ref 43–77)
NRBC # BLD: 0 /100 WBCS — SIGNIFICANT CHANGE UP (ref 0–0)
PLATELET # BLD AUTO: 398 K/UL — SIGNIFICANT CHANGE UP (ref 150–400)
POTASSIUM SERPL-MCNC: 3.5 MMOL/L — SIGNIFICANT CHANGE UP (ref 3.5–5.3)
POTASSIUM SERPL-SCNC: 3.5 MMOL/L — SIGNIFICANT CHANGE UP (ref 3.5–5.3)
PROT SERPL-MCNC: 9.2 G/DL — HIGH (ref 6–8.3)
PROTHROM AB SERPL-ACNC: 12.7 SEC — SIGNIFICANT CHANGE UP (ref 10.6–13.6)
RBC # BLD: 5.23 M/UL — HIGH (ref 3.8–5.2)
RBC # FLD: 15.2 % — HIGH (ref 10.3–14.5)
SODIUM SERPL-SCNC: 140 MMOL/L — SIGNIFICANT CHANGE UP (ref 135–145)
WBC # BLD: 13.77 K/UL — HIGH (ref 3.8–10.5)
WBC # FLD AUTO: 13.77 K/UL — HIGH (ref 3.8–10.5)

## 2020-08-27 PROCEDURE — 99223 1ST HOSP IP/OBS HIGH 75: CPT

## 2020-08-27 PROCEDURE — 99285 EMERGENCY DEPT VISIT HI MDM: CPT

## 2020-08-27 PROCEDURE — 76705 ECHO EXAM OF ABDOMEN: CPT | Mod: 26

## 2020-08-27 RX ORDER — ACETAMINOPHEN 500 MG
1000 TABLET ORAL ONCE
Refills: 0 | Status: DISCONTINUED | OUTPATIENT
Start: 2020-08-27 | End: 2020-08-31

## 2020-08-27 RX ORDER — SODIUM CHLORIDE 9 MG/ML
1000 INJECTION INTRAMUSCULAR; INTRAVENOUS; SUBCUTANEOUS ONCE
Refills: 0 | Status: COMPLETED | OUTPATIENT
Start: 2020-08-27 | End: 2020-08-27

## 2020-08-27 RX ORDER — DEXTROSE MONOHYDRATE, SODIUM CHLORIDE, AND POTASSIUM CHLORIDE 50; .745; 4.5 G/1000ML; G/1000ML; G/1000ML
1000 INJECTION, SOLUTION INTRAVENOUS
Refills: 0 | Status: DISCONTINUED | OUTPATIENT
Start: 2020-08-27 | End: 2020-08-28

## 2020-08-27 RX ORDER — MORPHINE SULFATE 50 MG/1
4 CAPSULE, EXTENDED RELEASE ORAL ONCE
Refills: 0 | Status: DISCONTINUED | OUTPATIENT
Start: 2020-08-27 | End: 2020-08-27

## 2020-08-27 RX ORDER — KETOROLAC TROMETHAMINE 30 MG/ML
15 SYRINGE (ML) INJECTION ONCE
Refills: 0 | Status: DISCONTINUED | OUTPATIENT
Start: 2020-08-27 | End: 2020-08-27

## 2020-08-27 RX ORDER — ONDANSETRON 8 MG/1
4 TABLET, FILM COATED ORAL EVERY 6 HOURS
Refills: 0 | Status: DISCONTINUED | OUTPATIENT
Start: 2020-08-27 | End: 2020-08-31

## 2020-08-27 RX ORDER — ONDANSETRON 8 MG/1
4 TABLET, FILM COATED ORAL ONCE
Refills: 0 | Status: COMPLETED | OUTPATIENT
Start: 2020-08-27 | End: 2020-08-27

## 2020-08-27 RX ORDER — MORPHINE SULFATE 50 MG/1
4 CAPSULE, EXTENDED RELEASE ORAL EVERY 4 HOURS
Refills: 0 | Status: DISCONTINUED | OUTPATIENT
Start: 2020-08-27 | End: 2020-08-28

## 2020-08-27 RX ORDER — HYDROMORPHONE HYDROCHLORIDE 2 MG/ML
0.5 INJECTION INTRAMUSCULAR; INTRAVENOUS; SUBCUTANEOUS EVERY 6 HOURS
Refills: 0 | Status: DISCONTINUED | OUTPATIENT
Start: 2020-08-27 | End: 2020-08-28

## 2020-08-27 RX ORDER — HEPARIN SODIUM 5000 [USP'U]/ML
5000 INJECTION INTRAVENOUS; SUBCUTANEOUS EVERY 8 HOURS
Refills: 0 | Status: DISCONTINUED | OUTPATIENT
Start: 2020-08-27 | End: 2020-08-31

## 2020-08-27 RX ORDER — PIPERACILLIN AND TAZOBACTAM 4; .5 G/20ML; G/20ML
3.38 INJECTION, POWDER, LYOPHILIZED, FOR SOLUTION INTRAVENOUS ONCE
Refills: 0 | Status: COMPLETED | OUTPATIENT
Start: 2020-08-27 | End: 2020-08-27

## 2020-08-27 RX ADMIN — SODIUM CHLORIDE 1000 MILLILITER(S): 9 INJECTION INTRAMUSCULAR; INTRAVENOUS; SUBCUTANEOUS at 20:51

## 2020-08-27 RX ADMIN — ONDANSETRON 4 MILLIGRAM(S): 8 TABLET, FILM COATED ORAL at 20:51

## 2020-08-27 RX ADMIN — PIPERACILLIN AND TAZOBACTAM 200 GRAM(S): 4; .5 INJECTION, POWDER, LYOPHILIZED, FOR SOLUTION INTRAVENOUS at 21:59

## 2020-08-27 RX ADMIN — MORPHINE SULFATE 4 MILLIGRAM(S): 50 CAPSULE, EXTENDED RELEASE ORAL at 20:51

## 2020-08-27 RX ADMIN — Medication 15 MILLIGRAM(S): at 23:00

## 2020-08-27 RX ADMIN — Medication 15 MILLIGRAM(S): at 22:30

## 2020-08-27 RX ADMIN — MORPHINE SULFATE 4 MILLIGRAM(S): 50 CAPSULE, EXTENDED RELEASE ORAL at 21:25

## 2020-08-27 NOTE — ED PROVIDER NOTE - OBJECTIVE STATEMENT
Reason For Visit  DAISY SRINIVASAN is an established patient here today for a follow-up management of 2 weeks and follow up of other conditions .   She is unaccompanied.        Quality    Adult Wellness CI height documented, discussion of regular exercise, exercising regularly, printed information given for activities, discussion of nutritional quality of diet, patient education given about proper diet, not using alcohol, not having considered quitting drinking, not getting angry when talked to about drinking, not having a drink or two in the morning to get going, not drinking alcohol regularly, and feeling guilty about it, colonoscopy performed: 08/31/2015, colonoscopy normal, no tobacco use, did not provide intervention and counseling in regards to tobacco use, mammogram performed: 06/13/18, pap smear performed: 03/30/2017, does not have feelings of hopelessness (PHQ-2), no Anhedonia (PHQ-2), not referred to local mental health center, not taking medication for depression, monitoring patient, preventive medicine therapy for influenza, preventive medicine therapy for pneumococcal, pain scale level reviewed, has not fallen within the last 12 months, able to walk, surrogate decision maker documented, not taking aspirin, no discussion of risks and benefits of Aspirin and no medications withdrawn because of contraindication.   Communication CI communication of results to PCP: , communication of plan to PCP: , communication of results to Patient: , communication of plan to Patient: , no tobacco use, did not provide intervention and counseling in regards to tobacco use and seeing a family doctor or pcp.   Smoking Cessation CI no tobacco use and did not provide intervention and counseling in regards to tobacco use.      History of Present Illness  2 wks followup her RLQ pain and epigastric pain.   mild constipation  epigastric abd pain few days ago. subsided after drank fluid.   no chest pain or dyspnea.  RLQ area no pain  but tender,. Symptoms include headache, constipation and abdominal pain, but no fever, no chills, no anorexia, no nasal discharge, no nasal passage blockage, no cough, no wheezing, no dyspnea, no shortness of breath, no hoarseness, no sore throat, no nausea, no vomiting, no diarrhea, no lower back pain, no dysuria and no change in urinary frequency.      Review of Systems    All other systems reviewed and negative.      Current Meds   1. Fish Oil 1000 MG Oral Capsule;   Therapy: (Recorded:02Oct2017) to Recorded   2. Glucosamine 1500 Complex Oral Capsule;   Therapy: (Recorded:02Oct2017) to Recorded   3. Vitamin D3 2000 UNIT Oral Tablet Chewable; one pills QOD;   Therapy: 30Oct2015 to (Last Rx:30Oct2015) Ordered    Active Problems  Acid reflux (K21.9)  Acute cystitis with hematuria (N30.01)  Acute hepatitis (B17.9)  Allergic rhinitis (J30.9)  Anemia (D64.9)  Atrophic vaginitis (N95.2)  Back pain, thoracic (M54.6)  Benign hypertension (I10)  Breast pain, right (N64.4)  Chest pain (R07.9)  Chronic lower back pain (M54.5,G89.29)  Conjunctival hemorrhage, right (H11.31)  Constipation, chronic (K59.09)  Cystocele without uterine prolapse (N81.10)  Depression (F32.9)  Dizziness and giddiness (R42)  Fatigue (R53.83)  Gastroenteritis (K52.9)  Glaucoma (H40.9)  Headache disorder (R51)  Hematuria (R31.9)  Hypotension determined by examination (I95.9)  Insomnia, transient (F51.02)  Leukopenia (D72.819)  Lower abdominal pain (R10.30)  Lower GI bleeding (K92.2)  Neck sprain and strain (S13.9XXA,S16.1XXA)  Occult blood positive stool (R19.5)  Pap smear for cervical cancer screening (Z12.4)  Plantar fasciitis, left (M72.2)  Plantar fasciitis, right (M72.2)  Rectocele, female (N81.6)  Tendinitis of left shoulder (M75.82)  Tendonitis of shoulder, right (M75.81)  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 pylori  H/O mammogram (Z92.89)  History of cystitis (Z87.440)    Surgical History  History of Iridectomy Peripheral, For Glaucoma    Family History  Mother   Family history of No known problems    Social History  Never a smoker    Lifestyle: consumes a diverse and healthy diet, has weight concerns, exercises regularly, does not use tobacco, denies alcohol use and denies drug use.      Review  Past medical history, problem list, family medical history, surgical history and social history reviewed.      Vitals  Signs   Recorded: 30Nov2018 03:26PM   Height: 4 ft 9 in  Weight: 111 lb 15.84 oz  BMI Calculated: 24.23  BSA Calculated: 1.41  Systolic: 148, LUE, Sitting  Diastolic: 64, LUE, Sitting  Temperature: 97.8 F, Tympanic  Heart Rate: 71  O2 Saturation: 99  Pain Scale: 00    Physical Exam  Constitutional: alert, in no acute distress and current vital signs reviewed.   Pulmonary: no respiratory distress, normal respiratory rate and effort and no accessory muscle use. breath sounds clear to auscultation bilaterally.   Cardiovascular: normal rate, no murmurs were heard, regular rhythm, normal S1 and normal S2. no right carotid bruit right dorsalis pedis 2+ no left carotid bruit left dorsalis pedis 2+ edema was not present in the lower extremities.   Abdomen: soft, nondistended, normal bowel sounds and no abdominal mass . mild tenderness over the RLQ area noted again today. no hepatomegaly and no splenomegaly. no umbilical hernia was discovered.   Psychiatric: oriented to person, oriented to place and oriented to time. alert and awake.      Results/Data    21Nov2018 12:01AM   Fecal Occult Blood, Tube Test   OCCULT BLOOD, TUBE TEST: POSITIVE  Abnormal Reference Range  NEGATIVE  09Nov2018 09:35AM   COMP METABOLIC PANEL WITH CBCA (CPNL,CBCA)   WHITE BLOOD COUNT: 7.2 K/mcL Reference Range 4.2-11.0  RED CELL COUNT: 4.11 mil/mcL Reference Range 4.00-5.20  HEMOGLOBIN: 12.0 g/dl Reference Range 12.0-15.5  HEMATOCRIT: 37.4 % Reference Range 36.0-46.5  MEAN CORPUSCULAR VOLUME: 91.0 fL Reference Range 78.0-100.0  MEAN CORPUSCULAR HEMOGLOBIN: 29.2 pg Reference Range 26.0-34.0  MEAN CORPUSCULAR HGB CONC: 32.1 g/dl Reference Range 32.0-36.5  RDW-CV: 12.3 % Reference Range 11.0-15.0  PLATELET COUNT: 273 K/mcL Reference Range 140-450  NRBC: 0 /100 WBC Reference Range 0  DIFF TYPE: AUTOMATED DIFFERENTIAL   PONCHO%: 65 %  LYM%: 22 %  MON%: 8 %  EOS%: 4 %  BASO%: 1 %  PERCENT IMMATURE GRANULOCYTES: 0 %  PONCHO ABS: 4.7 K/mcL Reference Range 1.8-7.7  LYM ABS: 1.6 K/mcL Reference Range 1.0-4.0  MON ABS: 0.6 K/mcL Reference Range 0.3-0.9  EOS ABS: 0.3 K/mcL Reference Range 0.1-0.5  BASO ABS: 0.1 K/mcL Reference Range 0.0-0.3  ABSOLUTE IMMATURE GRANULOCYTES: 0.0 K/mcL Reference Range 0-0.2  FASTING STATUS: FASTING hrs  SODIUM: 137 mmol/L Reference Range 135-145  POTASSIUM: 4.3 mmol/L Reference Range 3.4-5.1  CHLORIDE: 104 mmol/L Reference Range   CARBON DIOXIDE: 23 mmol/L Reference Range 21-32  ANION GAP: 14 mmol/L Reference Range 10-20  GLUCOSE: 85 mg/dl Reference Range 65-99  BUN: 17 mg/dl Reference Range 6-20  CREATININE: 0.71 mg/dl Reference Range 0.51-0.95  GFR EST. AMER: >90   GFR EST.NONAFRI AMER: 86   BUN/CREATININE RATIO: 24  Reference Range 7-25  CALCIUM: 9.3 mg/dl Reference Range 8.4-10.2  BILIRUBIN TOTAL: 0.5 mg/dl Reference Range 0.2-1.0  GOT/AST: 21 Units/L Reference Range <38  GPT/ALT: 23 Units/L Reference Range <79  ALKALINE PHOSPHATASE: 88 Units/L Reference Range   TOTAL PROTEIN: 7.5 g/dl Reference Range 6.4-8.2  ALBUMIN: 3.9 g/dl Reference Range 3.6-5.1  GLOBULIN (CALCULATED): 3.6 g/dl Reference Range 2.0-4.0  A/G RATIO: 1.1  Reference Range  1.0-2.4  Immunizations  Influenza --- Series1: 05-Sep-2015; Series2: 15-Sep-2016; Series3: 23-Oct-2017; Series4:  05-Oct-2018   PCV --- Series1: 23-Oct-2017   PPSV --- Series1: 28-Jun-2014   Zoster --- Series1: 02-Mar-2015     Assessment  Lower abdominal pain (R10.30)  Occult blood positive stool (R19.5)    Plan  Gastroenterology Referral/Consult Treatment and Evaluation  there is mild tenderness  over the RLQ area. stool ob was posititve. the pt had colonoscope  done at [facility] on 8/31/15, told to repeat in 5 yrs.  Status: Active   Requested for: 30Nov2018  Care Summary provided. : Yes  Plans:     Plan:   there is mild RLQ pain and tenderness. the stool OB is positive. hx of colonoscope done 8/2015. will get GI consult for this. referral given.  RTC one wk after the test done.   Medical compliance with plan discussed and risks of non-compliance reviewed.    Patient education completed on disease process, etiology & prognosis.    Patient expresses understanding of the plan.      Signatures   Electronically signed by : Lisa Carrero CMA; Nov 30 2018  3:31PM CST    Electronically signed by : JENIFER MILLER M.D.; Nov 30 2018  3:47PM CST    Electronically signed by : JENIFER MILLER M.D.; Dec  1 2018  4:55AM CST     30 y/o F patient recently gave birth 6 months ago presents to the ED c/o RUQ pain x3-4 days. Patient also endorses multiple episodes of non bloody non bilious emesis, nausea and subjective fevers and chills. Patient endorses RUQ pain radiates to back at times and pain with eating. Denies chest pain, shortness of breath, dysuria, hematuria, diarrhea, constipation, or any other acute complaints.

## 2020-08-27 NOTE — ED ADULT NURSE NOTE - OBJECTIVE STATEMENT
c/o RUQ "hot" pain radiating to the back since 2 pm w/ nausea and vomiting, states it happened 3 days ago which resolved after 6 hours, states today is worse

## 2020-08-27 NOTE — ED PROVIDER NOTE - CLINICAL SUMMARY MEDICAL DECISION MAKING FREE TEXT BOX
28 yo F with RUQ pain. Tender on exam. Labs remarkable for elevated bili and transaminitis. US with dilated CBD. Lipase over 100K. Not a drinker. Is breastfeeding. patient with likely gallstone pancreatitis. Discussed with GI and surgery and surgery will admit. Accepted to Dr. Presley's service.

## 2020-08-27 NOTE — H&P ADULT - NSICDXPASTMEDICALHX_GEN_ALL_CORE_FT
PAST MEDICAL HISTORY:  Migraines     Pyelonephritis during pregnancy     UTI (urinary tract infection) during pregnancy

## 2020-08-27 NOTE — H&P ADULT - HISTORY OF PRESENT ILLNESS
30 y/o female post partum 6 weeks  c/o RUQ radiating to back for several days with episodes n/v  denies known h/o gallstones  PMH-IBD, no home meds  denies surg history    < from: US Hepatic & Pancreatic (08.27.20 @ 20:20) >  IMPRESSION:    Distended gallbladder with cholelithiasis. No gallbladder wall thickening or pericholecystic fluid. Mildly dilated common bile duct measuring up to 7.3 mm. If there is further clinical concern for acute cholecystitis, HIDA scan should be considered.    Mild hepatic steatosis.    < end of copied text >

## 2020-08-27 NOTE — ED PROVIDER NOTE - PROGRESS NOTE DETAILS
Discussed case with GI, Dr. Feng, no emergent need for scope. Rec IVFs, abx, and surgery eval. Discussed with surgery, Dr. Robb who will eval patient. admitted to surgery service, Dr. Presley.

## 2020-08-28 DIAGNOSIS — K81.0 ACUTE CHOLECYSTITIS: ICD-10-CM

## 2020-08-28 DIAGNOSIS — D64.9 ANEMIA, UNSPECIFIED: ICD-10-CM

## 2020-08-28 LAB
ALBUMIN SERPL ELPH-MCNC: 3.1 G/DL — LOW (ref 3.5–5)
ALP SERPL-CCNC: 198 U/L — HIGH (ref 40–120)
ALT FLD-CCNC: 275 U/L DA — HIGH (ref 10–60)
ANION GAP SERPL CALC-SCNC: 3 MMOL/L — LOW (ref 5–17)
AST SERPL-CCNC: 215 U/L — HIGH (ref 10–40)
BILIRUB SERPL-MCNC: 0.8 MG/DL — SIGNIFICANT CHANGE UP (ref 0.2–1.2)
BUN SERPL-MCNC: 10 MG/DL — SIGNIFICANT CHANGE UP (ref 7–18)
CALCIUM SERPL-MCNC: 7.7 MG/DL — LOW (ref 8.4–10.5)
CHLORIDE SERPL-SCNC: 113 MMOL/L — HIGH (ref 96–108)
CO2 SERPL-SCNC: 28 MMOL/L — SIGNIFICANT CHANGE UP (ref 22–31)
CREAT SERPL-MCNC: 0.65 MG/DL — SIGNIFICANT CHANGE UP (ref 0.5–1.3)
GLUCOSE SERPL-MCNC: 82 MG/DL — SIGNIFICANT CHANGE UP (ref 70–99)
HCT VFR BLD CALC: 35.3 % — SIGNIFICANT CHANGE UP (ref 34.5–45)
HGB BLD-MCNC: 10.6 G/DL — LOW (ref 11.5–15.5)
LIDOCAIN IGE QN: 5608 U/L — HIGH (ref 73–393)
MCHC RBC-ENTMCNC: 23.4 PG — LOW (ref 27–34)
MCHC RBC-ENTMCNC: 30 GM/DL — LOW (ref 32–36)
MCV RBC AUTO: 77.9 FL — LOW (ref 80–100)
NRBC # BLD: 0 /100 WBCS — SIGNIFICANT CHANGE UP (ref 0–0)
PLATELET # BLD AUTO: 329 K/UL — SIGNIFICANT CHANGE UP (ref 150–400)
POTASSIUM SERPL-MCNC: 4.1 MMOL/L — SIGNIFICANT CHANGE UP (ref 3.5–5.3)
POTASSIUM SERPL-SCNC: 4.1 MMOL/L — SIGNIFICANT CHANGE UP (ref 3.5–5.3)
PROT SERPL-MCNC: 7.1 G/DL — SIGNIFICANT CHANGE UP (ref 6–8.3)
RBC # BLD: 4.53 M/UL — SIGNIFICANT CHANGE UP (ref 3.8–5.2)
RBC # FLD: 15.1 % — HIGH (ref 10.3–14.5)
SARS-COV-2 IGG SERPL QL IA: NEGATIVE — SIGNIFICANT CHANGE UP
SARS-COV-2 IGM SERPL IA-ACNC: <0.1 INDEX — SIGNIFICANT CHANGE UP
SARS-COV-2 RNA SPEC QL NAA+PROBE: SIGNIFICANT CHANGE UP
SODIUM SERPL-SCNC: 144 MMOL/L — SIGNIFICANT CHANGE UP (ref 135–145)
WBC # BLD: 8.06 K/UL — SIGNIFICANT CHANGE UP (ref 3.8–10.5)
WBC # FLD AUTO: 8.06 K/UL — SIGNIFICANT CHANGE UP (ref 3.8–10.5)

## 2020-08-28 PROCEDURE — 99232 SBSQ HOSP IP/OBS MODERATE 35: CPT

## 2020-08-28 RX ORDER — SODIUM CHLORIDE 9 MG/ML
1000 INJECTION INTRAMUSCULAR; INTRAVENOUS; SUBCUTANEOUS
Refills: 0 | Status: DISCONTINUED | OUTPATIENT
Start: 2020-08-28 | End: 2020-08-31

## 2020-08-28 RX ADMIN — SODIUM CHLORIDE 1000 MILLILITER(S): 9 INJECTION INTRAMUSCULAR; INTRAVENOUS; SUBCUTANEOUS at 01:42

## 2020-08-28 RX ADMIN — DEXTROSE MONOHYDRATE, SODIUM CHLORIDE, AND POTASSIUM CHLORIDE 175 MILLILITER(S): 50; .745; 4.5 INJECTION, SOLUTION INTRAVENOUS at 06:56

## 2020-08-28 RX ADMIN — SODIUM CHLORIDE 75 MILLILITER(S): 9 INJECTION INTRAMUSCULAR; INTRAVENOUS; SUBCUTANEOUS at 13:42

## 2020-08-28 RX ADMIN — HEPARIN SODIUM 5000 UNIT(S): 5000 INJECTION INTRAVENOUS; SUBCUTANEOUS at 01:41

## 2020-08-28 RX ADMIN — HEPARIN SODIUM 5000 UNIT(S): 5000 INJECTION INTRAVENOUS; SUBCUTANEOUS at 21:30

## 2020-08-28 NOTE — CONSULT NOTE ADULT - PROBLEM SELECTOR RECOMMENDATION 2
US abd showed distended gallbladder and cholelithiasis, consistent with acute cholecystitis. CBD is dilated at 7.3mm  - possibly gallstone already passed  - f/u MRCP  - monitor CMP  - surgery Dr. Presley is following up

## 2020-08-28 NOTE — CONSULT NOTE ADULT - SUBJECTIVE AND OBJECTIVE BOX
Patient is a 29y old  Female who presents with a chief complaint of gallstone pancreatitis (27 Aug 2020 22:46)     .     HPI:  Pt states abd pain started on 8/24 Mon, one time it subsided but came back yesterday and it was 10/10 intensity.   Only PMH is IBS, which got worse after delivery and she had diarrhea 2-3 weeks ago. Pt used to smoke marijuana, but quit 1 year ago when she got pregnant. Pt had occasional drink, and stopped after she got pregnant also. Pt denied cigaret smoking. She sweat a lot when she had pain, but denied fever or chills.  Currently pt doesn't have any abd pain.          REVIEW OF SYSTEMS  Constitutional:   No fever, no fatigue, no pallor, no night sweats, no weight loss.  HEENT:   No eye pain, no vision changes, no icterus, no mouth ulcers.  Respiratory:   No shortness of breath, no cough, no respiratory distress.   Cardiovascular:   No chest pain, no palpitations.   Gastrointestinal: No abdominal pain, no nausea, no vomiting , (+) diarrhea, no constipation, no hematochezia, no melena.  Skin:   No rashes, no jaundice, no eczema.   Musculoskeletal:   No joint pain, no swelling, no myalgia.   Neurologic:   No headache, no seizure, no weakness.   Genitourinary:   No dysuria, no decreased urine output.  Psychiatric:  No depression, no anxiety,   Endocrine:   No thyroid disease, no diabetes.  Heme/Lymphatic:   No anemia, no blood transfusions, no lymph node enlargement, no bleeding, no bruising.  ___________________________________________________________________________________________  Allergies    No Known Allergies    Intolerances      MEDICATIONS  (STANDING):  dextrose 5% + sodium chloride 0.45% with potassium chloride 20 mEq/L 1000 milliLiter(s) (175 mL/Hr) IV Continuous <Continuous>  heparin   Injectable 5000 Unit(s) SubCutaneous every 8 hours    MEDICATIONS  (PRN):  acetaminophen  IVPB .. 1000 milliGRAM(s) IV Intermittent once PRN Temp greater or equal to 38C (100.4F)  HYDROmorphone  Injectable 0.5 milliGRAM(s) IV Push every 6 hours PRN Severe Pain (7 - 10)  morphine  - Injectable 4 milliGRAM(s) IV Push every 4 hours PRN Moderate Pain (4 - 6)  ondansetron Injectable 4 milliGRAM(s) IV Push every 6 hours PRN Nausea and/or Vomiting      PAST MEDICAL & SURGICAL HISTORY:  Pyelonephritis during pregnancy  UTI (urinary tract infection) during pregnancy  Migraines  No significant past surgical history    FAMILY HISTORY:    Social History: No hsitory of : Tobacco use, IVDA, EToH  ______________________________________________________________________________________    PHYSICAL EXAM    Daily Height in cm: 145 (27 Aug 2020 19:24)    Daily   BMI: 35.2 (08-27 @ 19:24)  Change in Weight:  Vital Signs Last 24 Hrs  T(C): 36.6 (28 Aug 2020 10:55), Max: 36.8 (28 Aug 2020 06:43)  T(F): 97.8 (28 Aug 2020 10:55), Max: 98.2 (28 Aug 2020 06:43)  HR: 66 (28 Aug 2020 10:55) (59 - 97)  BP: 110/63 (28 Aug 2020 10:55) (95/59 - 132/83)  BP(mean): --  RR: 16 (28 Aug 2020 10:55) (16 - 18)  SpO2: 100% (28 Aug 2020 10:55) (100% - 100%)    General:  Well developed, well nourished, alert and active, no pallor, NAD.  HEENT:    Normal appearance of conjunctiva, ears, nose, lips, oropharynx, and oral mucosa, anicteric.  Neck:  No masses, no asymmetry.  Lymph Nodes:  No lymphadenopathy.   Cardiovascular:  RRR normal S1/S2, no murmur.  Respiratory:  CTA B/L, normal respiratory effort.   Abdominal:   soft, no masses or tenderness, normoactive BS, NT/ND, no HSM.  Extremities:   No clubbing or cyanosis, normal capillary refill, no edema.   Skin:   No rash, jaundice, lesions, eczema.   Musculoskeletal:  No joint swelling, erythema or tenderness.   Neuro: No focal deficits.   Other:   _______________________________________________________________________________________________  Lab Results:                          10.6   8.06  )-----------( 329      ( 28 Aug 2020 06:15 )             35.3     08-28    144  |  113<H>  |  10  ----------------------------<  82  4.1   |  28  |  0.65    Ca    7.7<L>      28 Aug 2020 06:15    TPro  7.1  /  Alb  3.1<L>  /  TBili  0.8  /  DBili  x   /  AST  215<H>  /  ALT  275<H>  /  AlkPhos  198<H>  08-28    LIVER FUNCTIONS - ( 28 Aug 2020 06:15 )  Alb: 3.1 g/dL / Pro: 7.1 g/dL / ALK PHOS: 198 U/L / ALT: 275 U/L DA / AST: 215 U/L / GGT: x           PT/INR - ( 27 Aug 2020 20:03 )   PT: 12.7 sec;   INR: 1.09 ratio         PTT - ( 27 Aug 2020 20:03 )  PTT:34.5 sec  Triglycerides, Serum: 79 mg/dL (08-27 @ 19:51)        Stool Results:          RADIOLOGY RESULTS:    < from: US Hepatic & Pancreatic (08.27.20 @ 20:20) >  FINDINGS:    Liver: Upper limits of normal in size with mild hepatic steatosis.  Bile ducts: Mildly dilated common bile duct measuring up to 7.3 mm. No evidence of intrahepatic biliary ductal dilatation.  Gallbladder: Mildly distended with cholelithiasis. No gallbladder wall thickening or pericholecystic fluid.  Pancreas: Visualized portions are within normal limits.  Right kidney: 11.4 cm in length. No hydronephrosis.  Aorta/IVC: Within normal limits.  Ascites: None    IMPRESSION:    Distended gallbladder with cholelithiasis. No gallbladder wall thickening or pericholecystic fluid. Mildly dilated common bile duct measuring up to 7.3 mm. If there is further clinical concern for acute cholecystitis, HIDA scan should be considered.    Mild hepatic steatosis.    < end of copied text >      SURGICAL PATHOLOGY:

## 2020-08-28 NOTE — CONSULT NOTE ADULT - ATTENDING COMMENTS
I was physically present for the key portions of the evaluation and management (E/M) service provided.  The patient was personally seen and examined at bedside.  I have edited the note as appropriate.       MRCP versus IOC   IVF fluids   Advance diet as pain improves   anbx for cholecystitis   Thank you for your consultation and allowing  me to participate in the care of your patients. If you have further questions please contact me at [phone].     Eris Reis M.D.       _________________________________________________________________________________________________  [facility]  [address] [place] [address]  Office: [phone]    Benito Hoffman PA-C  ___________________________________________________________________________________________________

## 2020-08-28 NOTE — PROGRESS NOTE ADULT - SUBJECTIVE AND OBJECTIVE BOX
30 yo woman with acute gallstone pancreatitis. Clinically improved - the abdominal pain is resolving, no N/V, afebrile. No biliary obstruction.   On exam - anicteric, abdomen is soft, non-tender except minimal epigastric discomfort, no peritonitis.     Plan:   Start clear liquid diet  Decrease IVF  Lap martita with IOC on Monday

## 2020-08-28 NOTE — CONSULT NOTE ADULT - ASSESSMENT
Patient is 30 yo female, s/p vaginal delivery 6 weeks ago, PMH of IBS, presented to ED c/p RUQ abd pain.

## 2020-08-29 PROCEDURE — 99231 SBSQ HOSP IP/OBS SF/LOW 25: CPT

## 2020-08-29 RX ADMIN — HEPARIN SODIUM 5000 UNIT(S): 5000 INJECTION INTRAVENOUS; SUBCUTANEOUS at 05:20

## 2020-08-29 RX ADMIN — HEPARIN SODIUM 5000 UNIT(S): 5000 INJECTION INTRAVENOUS; SUBCUTANEOUS at 21:11

## 2020-08-29 RX ADMIN — HEPARIN SODIUM 5000 UNIT(S): 5000 INJECTION INTRAVENOUS; SUBCUTANEOUS at 13:42

## 2020-08-29 RX ADMIN — SODIUM CHLORIDE 75 MILLILITER(S): 9 INJECTION INTRAMUSCULAR; INTRAVENOUS; SUBCUTANEOUS at 15:24

## 2020-08-29 NOTE — PROGRESS NOTE ADULT - SUBJECTIVE AND OBJECTIVE BOX
SUBJECTIVE    Nausea [ ] YES [X ] NO  Vomiting [ ] YES [X ] NO  Voiding normally [X ] YES [ ] NO  Flatus [X ] YES [ ] NO  Pain under control [X ] YES [ ] NO--she has no pain at all  Tolerating (diet)  Ambulated [X ] YES NO [ ]        VITALS    ICU Vital Signs Last 24 Hrs  T(C): 36.7 (29 Aug 2020 05:16), Max: 36.8 (28 Aug 2020 14:53)  T(F): 98 (29 Aug 2020 05:16), Max: 98.2 (28 Aug 2020 14:53)  HR: 67 (29 Aug 2020 05:16) (65 - 68)  BP: 107/58 (29 Aug 2020 05:16) (102/62 - 110/63)  BP(mean): --  ABP: --  ABP(mean): --  RR: 16 (29 Aug 2020 05:16) (16 - 17)  SpO2: 100% (29 Aug 2020 05:16) (98% - 100%)    I&O's Detail        PHYSICAL EXAMINATION    Abdomen:  soft, ND, NT upon palpation    I&O's Summary      LABS                        10.6   8.06  )-----------( 329      ( 28 Aug 2020 06:15 )             35.3             08-28    144  |  113<H>  |  10  ----------------------------<  82  4.1   |  28  |  0.65    Ca    7.7<L>      28 Aug 2020 06:15    TPro  7.1  /  Alb  3.1<L>  /  TBili  0.8  /  DBili  x   /  AST  215<H>  /  ALT  275<H>  /  AlkPhos  198<H>  08-28    LIVER FUNCTIONS - ( 28 Aug 2020 06:15 )  Alb: 3.1 g/dL / Pro: 7.1 g/dL / ALK PHOS: 198 U/L / ALT: 275 U/L DA / AST: 215 U/L / GGT: x             MEDICATIONS:  MEDICATIONS  (STANDING):  heparin   Injectable 5000 Unit(s) SubCutaneous every 8 hours  sodium chloride 0.9%. 1000 milliLiter(s) (75 mL/Hr) IV Continuous <Continuous>    MEDICATIONS  (PRN):  acetaminophen  IVPB .. 1000 milliGRAM(s) IV Intermittent once PRN Temp greater or equal to 38C (100.4F)  ondansetron Injectable 4 milliGRAM(s) IV Push every 6 hours PRN Nausea and/or Vomiting

## 2020-08-30 ENCOUNTER — TRANSCRIPTION ENCOUNTER (OUTPATIENT)
Age: 30
End: 2020-08-30

## 2020-08-30 RX ORDER — CHLORHEXIDINE GLUCONATE 213 G/1000ML
1 SOLUTION TOPICAL DAILY
Refills: 0 | Status: DISCONTINUED | OUTPATIENT
Start: 2020-08-30 | End: 2020-08-31

## 2020-08-30 RX ADMIN — HEPARIN SODIUM 5000 UNIT(S): 5000 INJECTION INTRAVENOUS; SUBCUTANEOUS at 05:08

## 2020-08-30 NOTE — PROGRESS NOTE ADULT - SUBJECTIVE AND OBJECTIVE BOX
SUBJECTIVE    she has no pain and feels well.       VITALS    ICU Vital Signs Last 24 Hrs  T(C): 36.6 (30 Aug 2020 06:10), Max: 36.7 (29 Aug 2020 20:34)  T(F): 97.8 (30 Aug 2020 06:10), Max: 98 (29 Aug 2020 20:34)  HR: 66 (30 Aug 2020 06:10) (63 - 69)  BP: 115/67 (30 Aug 2020 06:10) (99/72 - 118/57)  BP(mean): --  ABP: --  ABP(mean): --  RR: 18 (30 Aug 2020 06:10) (16 - 18)  SpO2: 100% (30 Aug 2020 06:10) (100% - 100%)    I&O's Detail        PHYSICAL EXAMINATION    Abdomen: benign    I&O's Summary      LABS                      MEDICATIONS:  MEDICATIONS  (STANDING):  heparin   Injectable 5000 Unit(s) SubCutaneous every 8 hours  sodium chloride 0.9%. 1000 milliLiter(s) (75 mL/Hr) IV Continuous <Continuous>    MEDICATIONS  (PRN):  acetaminophen  IVPB .. 1000 milliGRAM(s) IV Intermittent once PRN Temp greater or equal to 38C (100.4F)  ondansetron Injectable 4 milliGRAM(s) IV Push every 6 hours PRN Nausea and/or Vomiting

## 2020-08-30 NOTE — DIETITIAN INITIAL EVALUATION ADULT. - PERTINENT LABORATORY DATA
08-28 Na144 mmol/L Glu 82 mg/dL K+ 4.1 mmol/L Cr  0.65 mg/dL BUN 10 mg/dL     08-28 Alb 3.1 g/dL<L>       08-27 Chol --    LDL --    HDL --    Trig 79 mg/dL

## 2020-08-30 NOTE — DIETITIAN INITIAL EVALUATION ADULT. - OTHER INFO
Pt visited. Pt seen for NPO / clear liquid = 4 days. Pt is On clear liquid Diet. D/W RN {t tolerating clear Liquid. Plan is for Lap martita tomorrow. Pt is post partum 6 weeks. Pt pumping Breast Milk. NKFA Reported. May consider Prenatal Vitamin since  Pt is Breast Feeding Pt visited. Pt seen for NPO / clear liquid = 4 days. Pt is On clear liquid Diet. D/W RN {t tolerating clear Liquid. Plan is for Lap martita tomorrow. Pt is post partum 6 weeks. Pt pumping Breast Milk. NKFA Reported. May consider Prenatal Vitamin since  Pt is Breast Feeding. Per Pt  Post partum weight 170 lb.

## 2020-08-31 ENCOUNTER — RESULT REVIEW (OUTPATIENT)
Age: 30
End: 2020-08-31

## 2020-08-31 ENCOUNTER — TRANSCRIPTION ENCOUNTER (OUTPATIENT)
Age: 30
End: 2020-08-31

## 2020-08-31 LAB
ALBUMIN SERPL ELPH-MCNC: 3.7 G/DL — SIGNIFICANT CHANGE UP (ref 3.5–5)
ALP SERPL-CCNC: 163 U/L — HIGH (ref 40–120)
ALT FLD-CCNC: 124 U/L DA — HIGH (ref 10–60)
ANION GAP SERPL CALC-SCNC: 7 MMOL/L — SIGNIFICANT CHANGE UP (ref 5–17)
AST SERPL-CCNC: 36 U/L — SIGNIFICANT CHANGE UP (ref 10–40)
BILIRUB DIRECT SERPL-MCNC: 0.2 MG/DL — SIGNIFICANT CHANGE UP (ref 0–0.2)
BILIRUB INDIRECT FLD-MCNC: 0.8 MG/DL — SIGNIFICANT CHANGE UP (ref 0.2–1)
BILIRUB SERPL-MCNC: 1 MG/DL — SIGNIFICANT CHANGE UP (ref 0.2–1.2)
BLD GP AB SCN SERPL QL: SIGNIFICANT CHANGE UP
BUN SERPL-MCNC: 6 MG/DL — LOW (ref 7–18)
CALCIUM SERPL-MCNC: 9.1 MG/DL — SIGNIFICANT CHANGE UP (ref 8.4–10.5)
CHLORIDE SERPL-SCNC: 104 MMOL/L — SIGNIFICANT CHANGE UP (ref 96–108)
CO2 SERPL-SCNC: 26 MMOL/L — SIGNIFICANT CHANGE UP (ref 22–31)
CREAT SERPL-MCNC: 0.7 MG/DL — SIGNIFICANT CHANGE UP (ref 0.5–1.3)
GLUCOSE SERPL-MCNC: 74 MG/DL — SIGNIFICANT CHANGE UP (ref 70–99)
HCT VFR BLD CALC: 40.5 % — SIGNIFICANT CHANGE UP (ref 34.5–45)
HGB BLD-MCNC: 12.2 G/DL — SIGNIFICANT CHANGE UP (ref 11.5–15.5)
MCHC RBC-ENTMCNC: 23.2 PG — LOW (ref 27–34)
MCHC RBC-ENTMCNC: 30.1 GM/DL — LOW (ref 32–36)
MCV RBC AUTO: 77.1 FL — LOW (ref 80–100)
NRBC # BLD: 0 /100 WBCS — SIGNIFICANT CHANGE UP (ref 0–0)
PLATELET # BLD AUTO: 352 K/UL — SIGNIFICANT CHANGE UP (ref 150–400)
POTASSIUM SERPL-MCNC: 3.6 MMOL/L — SIGNIFICANT CHANGE UP (ref 3.5–5.3)
POTASSIUM SERPL-SCNC: 3.6 MMOL/L — SIGNIFICANT CHANGE UP (ref 3.5–5.3)
PROT SERPL-MCNC: 8.6 G/DL — HIGH (ref 6–8.3)
RBC # BLD: 5.25 M/UL — HIGH (ref 3.8–5.2)
RBC # FLD: 15.1 % — HIGH (ref 10.3–14.5)
SODIUM SERPL-SCNC: 137 MMOL/L — SIGNIFICANT CHANGE UP (ref 135–145)
WBC # BLD: 6.77 K/UL — SIGNIFICANT CHANGE UP (ref 3.8–10.5)
WBC # FLD AUTO: 6.77 K/UL — SIGNIFICANT CHANGE UP (ref 3.8–10.5)

## 2020-08-31 PROCEDURE — 47563 LAPARO CHOLECYSTECTOMY/GRAPH: CPT | Mod: AS

## 2020-08-31 PROCEDURE — 47563 LAPARO CHOLECYSTECTOMY/GRAPH: CPT

## 2020-08-31 PROCEDURE — 88304 TISSUE EXAM BY PATHOLOGIST: CPT | Mod: 26

## 2020-08-31 RX ORDER — HEPARIN SODIUM 5000 [USP'U]/ML
5000 INJECTION INTRAVENOUS; SUBCUTANEOUS EVERY 8 HOURS
Refills: 0 | Status: DISCONTINUED | OUTPATIENT
Start: 2020-08-31 | End: 2020-09-01

## 2020-08-31 RX ORDER — ACETAMINOPHEN 500 MG
1000 TABLET ORAL ONCE
Refills: 0 | Status: COMPLETED | OUTPATIENT
Start: 2020-08-31 | End: 2020-08-31

## 2020-08-31 RX ORDER — ACETAMINOPHEN 500 MG
650 TABLET ORAL EVERY 6 HOURS
Refills: 0 | Status: DISCONTINUED | OUTPATIENT
Start: 2020-08-31 | End: 2020-09-01

## 2020-08-31 RX ORDER — FENTANYL CITRATE 50 UG/ML
50 INJECTION INTRAVENOUS
Refills: 0 | Status: DISCONTINUED | OUTPATIENT
Start: 2020-08-31 | End: 2020-08-31

## 2020-08-31 RX ORDER — GABAPENTIN 400 MG/1
100 CAPSULE ORAL EVERY 6 HOURS
Refills: 0 | Status: DISCONTINUED | OUTPATIENT
Start: 2020-08-31 | End: 2020-09-01

## 2020-08-31 RX ORDER — ONDANSETRON 8 MG/1
4 TABLET, FILM COATED ORAL EVERY 6 HOURS
Refills: 0 | Status: DISCONTINUED | OUTPATIENT
Start: 2020-08-31 | End: 2020-09-01

## 2020-08-31 RX ORDER — FENTANYL CITRATE 50 UG/ML
25 INJECTION INTRAVENOUS
Refills: 0 | Status: DISCONTINUED | OUTPATIENT
Start: 2020-08-31 | End: 2020-08-31

## 2020-08-31 RX ORDER — GABAPENTIN 400 MG/1
2.5 CAPSULE ORAL
Qty: 15 | Refills: 0
Start: 2020-08-31 | End: 2020-09-02

## 2020-08-31 RX ORDER — METOCLOPRAMIDE HCL 10 MG
10 TABLET ORAL ONCE
Refills: 0 | Status: DISCONTINUED | OUTPATIENT
Start: 2020-08-31 | End: 2020-08-31

## 2020-08-31 RX ADMIN — FENTANYL CITRATE 50 MICROGRAM(S): 50 INJECTION INTRAVENOUS at 09:58

## 2020-08-31 RX ADMIN — Medication 1000 MILLIGRAM(S): at 13:30

## 2020-08-31 RX ADMIN — FENTANYL CITRATE 50 MICROGRAM(S): 50 INJECTION INTRAVENOUS at 10:13

## 2020-08-31 RX ADMIN — GABAPENTIN 100 MILLIGRAM(S): 400 CAPSULE ORAL at 17:13

## 2020-08-31 RX ADMIN — HEPARIN SODIUM 5000 UNIT(S): 5000 INJECTION INTRAVENOUS; SUBCUTANEOUS at 11:57

## 2020-08-31 RX ADMIN — Medication 400 MILLIGRAM(S): at 13:00

## 2020-08-31 RX ADMIN — FENTANYL CITRATE 25 MICROGRAM(S): 50 INJECTION INTRAVENOUS at 10:43

## 2020-08-31 RX ADMIN — GABAPENTIN 100 MILLIGRAM(S): 400 CAPSULE ORAL at 11:58

## 2020-08-31 RX ADMIN — FENTANYL CITRATE 25 MICROGRAM(S): 50 INJECTION INTRAVENOUS at 10:58

## 2020-08-31 NOTE — DISCHARGE NOTE PROVIDER - NSDCCPCAREPLAN_GEN_ALL_CORE_FT
PRINCIPAL DISCHARGE DIAGNOSIS  Diagnosis: Gallstone pancreatitis  Assessment and Plan of Treatment: Please folllow-up with Dr. Presley in the office; call to make an appointment. Call for any fever, chills, nausea, vomiting, pain unrelieved by medications, or inability to pass flatus or BM. PRINCIPAL DISCHARGE DIAGNOSIS  Diagnosis: Gallstone pancreatitis  Assessment and Plan of Treatment: Please folllow-up with Dr. Presley in the office; call to make an appointment. Call for any fever, chills, nausea, vomiting, pain unrelieved by medications, or inability to pass flatus or BM.  Pump and dump your breast milk for the duration of at least 24 hours to remove any narcotic medications received during anesthesia on the day of your surgery 8/31/20.   You may breastfeed while taking Tylenol, Motrin or Gabapentin for your pain.

## 2020-08-31 NOTE — DISCHARGE NOTE PROVIDER - CARE PROVIDER_API CALL
Bernardo Presley (MD)  Surgery  [address] [place] [address]  Phone: [phone]  Fax: [phone]  Follow Up Time:

## 2020-08-31 NOTE — DISCHARGE NOTE PROVIDER - NSDCMRMEDTOKEN_GEN_ALL_CORE_FT
acetaminophen 325 mg oral capsule: 2 cap(s) orally every 6 hours   ibuprofen 600 mg oral tablet: 1 tab(s) orally every 6 hours acetaminophen 325 mg oral capsule: 2 cap(s) orally every 6 hours   gabapentin 250 mg/5 mL oral solution: 2.5 milliliter(s) orally every 12 hours   ibuprofen 600 mg oral tablet: 1 tab(s) orally every 6 hours

## 2020-08-31 NOTE — DISCHARGE NOTE PROVIDER - HOSPITAL COURSE
29F  6 weeks post-partum presented to the ED c/o RUQ radiating to back for several days with episodes n/v. Denies known h/o gallstones or surgical history Ultrasound was done that showed a distended gallbladder with cholelithiasis. No gallbladder wall thickening or pericholecystic fluid. Mildly dilated common bile duct measuring up to 7.3 mm. On admission patients lipase was found to be [id number]. Patient underwent a laparoscopic cholecystectomy 8/31/20 with a negative intra-operative cholangiogram. Patient tolerated the procedure well; diet was advanced as tolerated and patient was stable for discharge. 29F  6 weeks post-partum presented to the ED c/o RUQ radiating to back for several days with episodes n/v. Denies known h/o gallstones or surgical history. Ultrasound was done that showed a distended gallbladder with cholelithiasis. No gallbladder wall thickening or pericholecystic fluid. Mildly dilated common bile duct measuring up to 7.3 mm. Labs were remarkable for a lipase of [id number]. Patient was admitted to the hospital and was kept NPO and given IV fluids and IV antibiotics. Pt underwent a laparoscopic cholecystectomy 8/31/20 with a negative intra-operative cholangiogram. Patient tolerated the procedure well; diet was advanced as tolerated and patient was stable for discharge.

## 2020-08-31 NOTE — PROGRESS NOTE ADULT - SUBJECTIVE AND OBJECTIVE BOX
28 yo woman with acute pancreatitis due to gallstones - now resolved, plan for laparoscopic cholecystectomy today.  Risk, benefits and alternatives to surgery were discussed with the patient and informed consent was signed.

## 2020-08-31 NOTE — PROGRESS NOTE ADULT - SUBJECTIVE AND OBJECTIVE BOX
POST-OPERATIVE NOTE    Subjective: Patient is s/p lap martita POD#0. Patient tolerating regular diet without nausea or vomiting. Denies chest pain, sob, fever or chills. Pain is improved with medication. Ambulating independently. Voiding without problems.    Vital Signs Last 24 Hrs  T(C): 37.1 (31 Aug 2020 16:32), Max: 37.2 (31 Aug 2020 10:58)  T(F): 98.8 (31 Aug 2020 16:32), Max: 99 (31 Aug 2020 10:58)  HR: 66 (31 Aug 2020 16:32) (58 - 80)  BP: 104/61 (31 Aug 2020 16:32) (91/49 - 145/66)  BP(mean): 86 (31 Aug 2020 10:58) (60 - 91)  RR: 16 (31 Aug 2020 16:32) (10 - 21)  SpO2: 99% (31 Aug 2020 16:32) (95% - 100%)    Physical Exam:  General: NAD, resting comfortably in bed  Pulmonary: Nonlabored breathing, no respiratory distress  Abdominal: soft, NT/ND, dressing c/d/i  Extremities: warm and well perfused    LABS:                      12.2   6.77  )-----------( 352      ( 31 Aug 2020 07:34 )             40.5     08-31    137  |  104  |  6<L>  ----------------------------<  74  3.6   |  26  |  0.70    Ca    9.1      31 Aug 2020 07:34    TPro  8.6<H>  /  Alb  3.7  /  TBili  1.0  /  DBili  0.2  /  AST  36  /  ALT  124<H>  /  AlkPhos  163<H>  08-31

## 2020-09-01 ENCOUNTER — TRANSCRIPTION ENCOUNTER (OUTPATIENT)
Age: 30
End: 2020-09-01

## 2020-09-01 VITALS
SYSTOLIC BLOOD PRESSURE: 112 MMHG | OXYGEN SATURATION: 100 % | HEART RATE: 62 BPM | TEMPERATURE: 98 F | DIASTOLIC BLOOD PRESSURE: 57 MMHG | RESPIRATION RATE: 16 BRPM

## 2020-09-01 PROCEDURE — 76705 ECHO EXAM OF ABDOMEN: CPT

## 2020-09-01 PROCEDURE — 84702 CHORIONIC GONADOTROPIN TEST: CPT

## 2020-09-01 PROCEDURE — 86769 SARS-COV-2 COVID-19 ANTIBODY: CPT

## 2020-09-01 PROCEDURE — 86900 BLOOD TYPING SEROLOGIC ABO: CPT

## 2020-09-01 PROCEDURE — U0003: CPT

## 2020-09-01 PROCEDURE — 85730 THROMBOPLASTIN TIME PARTIAL: CPT

## 2020-09-01 PROCEDURE — 88304 TISSUE EXAM BY PATHOLOGIST: CPT

## 2020-09-01 PROCEDURE — 86850 RBC ANTIBODY SCREEN: CPT

## 2020-09-01 PROCEDURE — 80048 BASIC METABOLIC PNL TOTAL CA: CPT

## 2020-09-01 PROCEDURE — 96374 THER/PROPH/DIAG INJ IV PUSH: CPT

## 2020-09-01 PROCEDURE — 80053 COMPREHEN METABOLIC PANEL: CPT

## 2020-09-01 PROCEDURE — 84478 ASSAY OF TRIGLYCERIDES: CPT

## 2020-09-01 PROCEDURE — 96375 TX/PRO/DX INJ NEW DRUG ADDON: CPT

## 2020-09-01 PROCEDURE — 85027 COMPLETE CBC AUTOMATED: CPT

## 2020-09-01 PROCEDURE — 83690 ASSAY OF LIPASE: CPT

## 2020-09-01 PROCEDURE — 80076 HEPATIC FUNCTION PANEL: CPT

## 2020-09-01 PROCEDURE — 76000 FLUOROSCOPY <1 HR PHYS/QHP: CPT

## 2020-09-01 PROCEDURE — 86901 BLOOD TYPING SEROLOGIC RH(D): CPT

## 2020-09-01 PROCEDURE — 36415 COLL VENOUS BLD VENIPUNCTURE: CPT

## 2020-09-01 PROCEDURE — 85610 PROTHROMBIN TIME: CPT

## 2020-09-01 PROCEDURE — 99285 EMERGENCY DEPT VISIT HI MDM: CPT | Mod: 25

## 2020-09-01 RX ADMIN — GABAPENTIN 100 MILLIGRAM(S): 400 CAPSULE ORAL at 05:49

## 2020-09-01 RX ADMIN — Medication 650 MILLIGRAM(S): at 00:58

## 2020-09-01 RX ADMIN — Medication 650 MILLIGRAM(S): at 01:28

## 2020-09-01 RX ADMIN — GABAPENTIN 100 MILLIGRAM(S): 400 CAPSULE ORAL at 00:59

## 2020-09-01 NOTE — PROGRESS NOTE ADULT - SUBJECTIVE AND OBJECTIVE BOX
INTERVAL HPI/OVERNIGHT EVENTS:    Ms. Lentz is a 28 y/o F with no pertinent medical history who is POD #1 s/p laparoscopic cholecystectomy for gallstone pancreatitis. There were no acute events overnight. Patient is resting comfortably and has no complaints this morning.     Vital Signs Last 24 Hrs  T(C): 36.7 (01 Sep 2020 05:26), Max: 37.2 (31 Aug 2020 10:58)  T(F): 98.1 (01 Sep 2020 05:26), Max: 99 (31 Aug 2020 10:58)  HR: 58 (01 Sep 2020 05:26) (58 - 80)  BP: 101/49 (01 Sep 2020 05:26) (101/49 - 145/66)  BP(mean): 86 (31 Aug 2020 10:58) (82 - 91)  RR: 16 (01 Sep 2020 05:26) (10 - 21)  SpO2: 100% (01 Sep 2020 05:26) (95% - 100%)    Physical Exam:    Gen:   HEENT:   Chest:   Abd:  Pelvic:  Ext:       MEDICATIONS  (STANDING):  gabapentin 100 milliGRAM(s) Oral every 6 hours  heparin   Injectable 5000 Unit(s) SubCutaneous every 8 hours    MEDICATIONS  (PRN):  acetaminophen   Tablet .. 650 milliGRAM(s) Oral every 6 hours PRN Moderate Pain (4 - 6)  ondansetron Injectable 4 milliGRAM(s) IV Push every 6 hours PRN Nausea and/or Vomiting      Labs:                          12.2   6.77  )-----------( 352      ( 31 Aug 2020 07:34 )             40.5     08-31    137  |  104  |  6<L>  ----------------------------<  74  3.6   |  26  |  0.70    Ca    9.1      31 Aug 2020 07:34    TPro  8.6<H>  /  Alb  3.7  /  TBili  1.0  /  DBili  0.2  /  AST  36  /  ALT  124<H>  /  AlkPhos  163<H>  08-31        I&O's Detail    31 Aug 2020 07:01  -  01 Sep 2020 07:00  --------------------------------------------------------  IN:    Lactated Ringers IV Bolus: 550 mL  Total IN: 550 mL    OUT:    Estimated Blood Loss: 10 mL  Total OUT: 10 mL    Total NET: 540 mL          Radiology: INTERVAL HPI/OVERNIGHT EVENTS:    Ms. Lentz is a 28 y/o F with no pertinent medical history who is POD #1 s/p laparoscopic cholecystectomy for gallstone pancreatitis. There were no acute events overnight. Patient is resting comfortably and has no complaints this morning. She has not passed gas or had a bowel movement yet. Patient is 6 weeks post-partum and dumped all breast milk that was pumped yesterday per anesthesiology.    Vital Signs Last 24 Hrs  T(C): 36.7 (01 Sep 2020 05:26), Max: 37.2 (31 Aug 2020 10:58)  T(F): 98.1 (01 Sep 2020 05:26), Max: 99 (31 Aug 2020 10:58)  HR: 58 (01 Sep 2020 05:26) (58 - 80)  BP: 101/49 (01 Sep 2020 05:26) (101/49 - 145/66)  BP(mean): 86 (31 Aug 2020 10:58) (82 - 91)  RR: 16 (01 Sep 2020 05:26) (10 - 21)  SpO2: 100% (01 Sep 2020 05:26) (95% - 100%)    Physical Exam:    Gen: WDWN female, laying comfortably in bed, NAD, pleasant and cooperative  HEENT: NC/AT, no scleral icterus, MMM  CVS/Pulm: no increased work of breathing  Abd: 3 RUQ and 1 midline laparoscopic incision sites with overlying Steri-Strips, minimal TTP, soft, non-distended  Ext: no calf tenderness or pedal edema      MEDICATIONS  (STANDING):  gabapentin 100 milliGRAM(s) Oral every 6 hours  heparin   Injectable 5000 Unit(s) SubCutaneous every 8 hours    MEDICATIONS  (PRN):  acetaminophen   Tablet .. 650 milliGRAM(s) Oral every 6 hours PRN Moderate Pain (4 - 6)  ondansetron Injectable 4 milliGRAM(s) IV Push every 6 hours PRN Nausea and/or Vomiting      Labs:                          12.2   6.77  )-----------( 352      ( 31 Aug 2020 07:34 )             40.5     08-31    137  |  104  |  6<L>  ----------------------------<  74  3.6   |  26  |  0.70    Ca    9.1      31 Aug 2020 07:34    TPro  8.6<H>  /  Alb  3.7  /  TBili  1.0  /  DBili  0.2  /  AST  36  /  ALT  124<H>  /  AlkPhos  163<H>  08-31        I&O's Detail    31 Aug 2020 07:01  -  01 Sep 2020 07:00  --------------------------------------------------------  IN:    Lactated Ringers IV Bolus: 550 mL  Total IN: 550 mL    OUT:    Estimated Blood Loss: 10 mL  Total OUT: 10 mL    Total NET: 540 mL

## 2020-09-01 NOTE — PROGRESS NOTE ADULT - SUBJECTIVE AND OBJECTIVE BOX
INTERVAL HPI/OVERNIGHT EVENTS:     28 y/o F POD #1 s/p laparoscopic cholecystectomy for gallstone pancreatitis. There were no acute events overnight. Patient is resting comfortably and has no complaints this morning.     Vital Signs Last 24 Hrs  T(C): 36.7 (01 Sep 2020 05:26), Max: 37.2 (31 Aug 2020 10:58)  T(F): 98.1 (01 Sep 2020 05:26), Max: 99 (31 Aug 2020 10:58)  HR: 58 (01 Sep 2020 05:26) (58 - 80)  BP: 101/49 (01 Sep 2020 05:26) (101/49 - 145/66)  BP(mean): 86 (31 Aug 2020 10:58) (82 - 91)  RR: 16 (01 Sep 2020 05:26) (10 - 21)  SpO2: 100% (01 Sep 2020 05:26) (95% - 100%)    Physical Exam:    Gen: WDWN female, laying comfortably in bed, NAD, pleasant and cooperative  HEENT: NC/AT, no scleral icterus, MMM  CVS/Pulm: no increased work of breathing  Abd: 3 RUQ and 1 midline laparoscopic incision sites with overlying Steri-Strips, minimal TTP, soft, non-distended  Ext: no calf tenderness or pedal edema      MEDICATIONS  (STANDING):  gabapentin 100 milliGRAM(s) Oral every 6 hours  heparin   Injectable 5000 Unit(s) SubCutaneous every 8 hours    MEDICATIONS  (PRN):  acetaminophen   Tablet .. 650 milliGRAM(s) Oral every 6 hours PRN Moderate Pain (4 - 6)  ondansetron Injectable 4 milliGRAM(s) IV Push every 6 hours PRN Nausea and/or Vomiting      Labs:                          12.2   6.77  )-----------( 352      ( 31 Aug 2020 07:34 )             40.5     08-31    137  |  104  |  6<L>  ----------------------------<  74  3.6   |  26  |  0.70    Ca    9.1      31 Aug 2020 07:34    TPro  8.6<H>  /  Alb  3.7  /  TBili  1.0  /  DBili  0.2  /  AST  36  /  ALT  124<H>  /  AlkPhos  163<H>  08-31        I&O's Detail    31 Aug 2020 07:01  -  01 Sep 2020 07:00  --------------------------------------------------------  IN:    Lactated Ringers IV Bolus: 550 mL  Total IN: 550 mL    OUT:    Estimated Blood Loss: 10 mL  Total OUT: 10 mL    Total NET: 540 mL

## 2020-09-01 NOTE — DISCHARGE NOTE NURSING/CASE MANAGEMENT/SOCIAL WORK - PATIENT PORTAL LINK FT
You can access the FollowMyHealth Patient Portal offered by [facility] by registering at the following website: [email or web address]. By joining [facility]’s FollowMyHealth portal, you will also be able to view your health information using other applications (apps) compatible with our system.

## 2020-09-01 NOTE — PROGRESS NOTE ADULT - REASON FOR ADMISSION
Gallstone pancreatitis

## 2020-09-01 NOTE — PROGRESS NOTE ADULT - ASSESSMENT
29F s/p lap martita POD#0, stable    - cont reg diet  - d/c planning in AM  - DVT ppx/OOB to chair  - prn pain control
29F with resolved gallstone pancreatitis. For OR this admission for lap chol to avoid recurrence.    1) preop mode
29FR with resolving gallstone pancreatitis. No pain.    1) clears today  2) reg diet tomorrow  3) OR this admission to prevent recurrence
Assessment:     28 y/o F POD #1 s/p laparoscopic cholecystectomy for gallstone pancreatitis    Plan:    1. Pain control PRN  2. regular diet   3. D/c home
Assessment:     30 y/o F POD #1 s/p laparoscopic cholecystectomy for gallstone pancreatitis    Plan:    1. Pain control PRN  2. Discharge later today

## 2020-09-03 LAB — SURGICAL PATHOLOGY STUDY: SIGNIFICANT CHANGE UP

## 2021-09-08 NOTE — ED ADULT NURSE NOTE - NSIMPLEMENTINTERV_GEN_ALL_ED
97.5 Implemented All Universal Safety Interventions:  [place] to call system. Call bell, personal items and telephone within reach. Instruct patient to call for assistance. Room bathroom lighting operational. Non-slip footwear when patient is off stretcher. Physically safe environment: no spills, clutter or unnecessary equipment. Stretcher in lowest position, wheels locked, appropriate side rails in place.

## 2022-02-08 NOTE — ED PROCEDURE NOTE - EBL
Is she still out of town? I cannot see her records from the outside ED visit to confirm what her rhythm was or what recommendations were made. If she has a home BP cuff, I would like her to repeat her blood pressure and if there is room in her BP we can increase her beta-blocker further. Thanks. minimal

## 2022-09-15 NOTE — ED ADULT NURSE NOTE - HOW OFTEN DO YOU HAVE A DRINK CONTAINING ALCOHOL?
[No JVD] : no jugular venous distention [Normal] : normal rate, regular rhythm, normal S1 and S2 and no murmur heard [No Carotid Bruits] : no carotid bruits [No Edema] : there was no peripheral edema [de-identified] : +sinus arrhythmia; no S4 Never

## 2023-02-24 NOTE — ED ADULT NURSE NOTE - ALCOHOL PRE SCREEN (AUDIT - C)
[facility] Approved    Prior Authorization Number: [id number]  Dates of approval: 2/24/23-5/24/23  Filling Pharmacy: [facility]      
Home
Statement Selected

## 2024-11-17 ENCOUNTER — EMERGENCY (EMERGENCY)
Facility: HOSPITAL | Age: 34
LOS: 1 days | Discharge: HOME | End: 2024-11-17
Attending: STUDENT IN AN ORGANIZED HEALTH CARE EDUCATION/TRAINING PROGRAM
Payer: MEDICAID

## 2024-11-17 VITALS
DIASTOLIC BLOOD PRESSURE: 79 MMHG | OXYGEN SATURATION: 98 % | WEIGHT: 158.73 LBS | HEART RATE: 102 BPM | RESPIRATION RATE: 17 BRPM | SYSTOLIC BLOOD PRESSURE: 124 MMHG | TEMPERATURE: 98 F

## 2024-11-17 VITALS
HEART RATE: 83 BPM | DIASTOLIC BLOOD PRESSURE: 71 MMHG | RESPIRATION RATE: 16 BRPM | OXYGEN SATURATION: 98 % | TEMPERATURE: 98 F | SYSTOLIC BLOOD PRESSURE: 122 MMHG

## 2024-11-17 LAB
ALBUMIN SERPL ELPH-MCNC: 4 G/DL — SIGNIFICANT CHANGE UP (ref 3.5–5)
ALP SERPL-CCNC: 115 U/L — SIGNIFICANT CHANGE UP (ref 40–120)
ALT FLD-CCNC: 65 U/L DA — HIGH (ref 10–60)
ANION GAP SERPL CALC-SCNC: 4 MMOL/L — LOW (ref 5–17)
ANISOCYTOSIS BLD QL: SIGNIFICANT CHANGE UP
APPEARANCE UR: ABNORMAL
AST SERPL-CCNC: 45 U/L — HIGH (ref 10–40)
BACTERIA # UR AUTO: ABNORMAL /HPF
BASOPHILS # BLD AUTO: 0.02 K/UL — SIGNIFICANT CHANGE UP (ref 0–0.2)
BASOPHILS NFR BLD AUTO: 0.2 % — SIGNIFICANT CHANGE UP (ref 0–2)
BILIRUB SERPL-MCNC: 0.9 MG/DL — SIGNIFICANT CHANGE UP (ref 0.2–1.2)
BILIRUB UR-MCNC: NEGATIVE — SIGNIFICANT CHANGE UP
BUN SERPL-MCNC: 11 MG/DL — SIGNIFICANT CHANGE UP (ref 7–18)
CALCIUM SERPL-MCNC: 8.9 MG/DL — SIGNIFICANT CHANGE UP (ref 8.4–10.5)
CHLORIDE SERPL-SCNC: 106 MMOL/L — SIGNIFICANT CHANGE UP (ref 96–108)
CO2 SERPL-SCNC: 27 MMOL/L — SIGNIFICANT CHANGE UP (ref 22–31)
COLOR SPEC: YELLOW — SIGNIFICANT CHANGE UP
CREAT SERPL-MCNC: 0.7 MG/DL — SIGNIFICANT CHANGE UP (ref 0.5–1.3)
DIFF PNL FLD: NEGATIVE — SIGNIFICANT CHANGE UP
EGFR: 118 ML/MIN/1.73M2 — SIGNIFICANT CHANGE UP
EOSINOPHIL # BLD AUTO: 0.01 K/UL — SIGNIFICANT CHANGE UP (ref 0–0.5)
EOSINOPHIL NFR BLD AUTO: 0.1 % — SIGNIFICANT CHANGE UP (ref 0–6)
EPI CELLS # UR: PRESENT
GIANT PLATELETS BLD QL SMEAR: PRESENT — SIGNIFICANT CHANGE UP
GLUCOSE SERPL-MCNC: 111 MG/DL — HIGH (ref 70–99)
GLUCOSE UR QL: NEGATIVE MG/DL — SIGNIFICANT CHANGE UP
HCG SERPL-ACNC: <1 MIU/ML — SIGNIFICANT CHANGE UP
HCT VFR BLD CALC: 38.9 % — SIGNIFICANT CHANGE UP (ref 34.5–45)
HGB BLD-MCNC: 12 G/DL — SIGNIFICANT CHANGE UP (ref 11.5–15.5)
HYPOCHROMIA BLD QL: SLIGHT — SIGNIFICANT CHANGE UP
IMM GRANULOCYTES NFR BLD AUTO: 0.6 % — SIGNIFICANT CHANGE UP (ref 0–0.9)
KETONES UR-MCNC: ABNORMAL MG/DL
LACTATE SERPL-SCNC: 1.8 MMOL/L — SIGNIFICANT CHANGE UP (ref 0.7–2)
LEUKOCYTE ESTERASE UR-ACNC: ABNORMAL
LG PLATELETS BLD QL AUTO: SLIGHT — SIGNIFICANT CHANGE UP
LIDOCAIN IGE QN: 25 U/L — SIGNIFICANT CHANGE UP (ref 13–75)
LYMPHOCYTES # BLD AUTO: 0.7 K/UL — LOW (ref 1–3.3)
LYMPHOCYTES # BLD AUTO: 5.6 % — LOW (ref 13–44)
MANUAL SMEAR VERIFICATION: SIGNIFICANT CHANGE UP
MCHC RBC-ENTMCNC: 22.2 PG — LOW (ref 27–34)
MCHC RBC-ENTMCNC: 30.8 G/DL — LOW (ref 32–36)
MCV RBC AUTO: 71.9 FL — LOW (ref 80–100)
MICROCYTES BLD QL: SLIGHT — SIGNIFICANT CHANGE UP
MONOCYTES # BLD AUTO: 0.48 K/UL — SIGNIFICANT CHANGE UP (ref 0–0.9)
MONOCYTES NFR BLD AUTO: 3.8 % — SIGNIFICANT CHANGE UP (ref 2–14)
NEUTROPHILS # BLD AUTO: 11.21 K/UL — HIGH (ref 1.8–7.4)
NEUTROPHILS NFR BLD AUTO: 89.7 % — HIGH (ref 43–77)
NITRITE UR-MCNC: NEGATIVE — SIGNIFICANT CHANGE UP
NRBC # BLD: 0 /100 WBCS — SIGNIFICANT CHANGE UP (ref 0–0)
OVALOCYTES BLD QL SMEAR: SLIGHT — SIGNIFICANT CHANGE UP
PH UR: 7.5 — SIGNIFICANT CHANGE UP (ref 5–8)
PLAT MORPH BLD: NORMAL — SIGNIFICANT CHANGE UP
PLATELET # BLD AUTO: 423 K/UL — HIGH (ref 150–400)
POIKILOCYTOSIS BLD QL AUTO: SIGNIFICANT CHANGE UP
POTASSIUM SERPL-MCNC: 3.6 MMOL/L — SIGNIFICANT CHANGE UP (ref 3.5–5.3)
POTASSIUM SERPL-SCNC: 3.6 MMOL/L — SIGNIFICANT CHANGE UP (ref 3.5–5.3)
PROT SERPL-MCNC: 8.7 G/DL — HIGH (ref 6–8.3)
PROT UR-MCNC: ABNORMAL MG/DL
RBC # BLD: 5.41 M/UL — HIGH (ref 3.8–5.2)
RBC # FLD: 16.1 % — HIGH (ref 10.3–14.5)
RBC BLD AUTO: ABNORMAL
RBC CASTS # UR COMP ASSIST: 0 /HPF — SIGNIFICANT CHANGE UP (ref 0–4)
SODIUM SERPL-SCNC: 137 MMOL/L — SIGNIFICANT CHANGE UP (ref 135–145)
SP GR SPEC: 1.02 — SIGNIFICANT CHANGE UP (ref 1–1.03)
UROBILINOGEN FLD QL: 1 MG/DL — SIGNIFICANT CHANGE UP (ref 0.2–1)
WBC # BLD: 12.49 K/UL — HIGH (ref 3.8–10.5)
WBC # FLD AUTO: 12.49 K/UL — HIGH (ref 3.8–10.5)
WBC UR QL: 5 /HPF — SIGNIFICANT CHANGE UP (ref 0–5)

## 2024-11-17 PROCEDURE — 83690 ASSAY OF LIPASE: CPT

## 2024-11-17 PROCEDURE — 96374 THER/PROPH/DIAG INJ IV PUSH: CPT

## 2024-11-17 PROCEDURE — 85025 COMPLETE CBC W/AUTO DIFF WBC: CPT

## 2024-11-17 PROCEDURE — 87077 CULTURE AEROBIC IDENTIFY: CPT

## 2024-11-17 PROCEDURE — 81001 URINALYSIS AUTO W/SCOPE: CPT

## 2024-11-17 PROCEDURE — 83605 ASSAY OF LACTIC ACID: CPT

## 2024-11-17 PROCEDURE — 96375 TX/PRO/DX INJ NEW DRUG ADDON: CPT

## 2024-11-17 PROCEDURE — 84702 CHORIONIC GONADOTROPIN TEST: CPT

## 2024-11-17 PROCEDURE — 87086 URINE CULTURE/COLONY COUNT: CPT

## 2024-11-17 PROCEDURE — 99284 EMERGENCY DEPT VISIT MOD MDM: CPT

## 2024-11-17 PROCEDURE — 80053 COMPREHEN METABOLIC PANEL: CPT

## 2024-11-17 PROCEDURE — 99284 EMERGENCY DEPT VISIT MOD MDM: CPT | Mod: 25

## 2024-11-17 PROCEDURE — 87186 SC STD MICRODIL/AGAR DIL: CPT

## 2024-11-17 PROCEDURE — 36415 COLL VENOUS BLD VENIPUNCTURE: CPT

## 2024-11-17 RX ORDER — SODIUM CHLORIDE 9 MG/ML
1000 INJECTION, SOLUTION INTRAMUSCULAR; INTRAVENOUS; SUBCUTANEOUS ONCE
Refills: 0 | Status: COMPLETED | OUTPATIENT
Start: 2024-11-17 | End: 2024-11-17

## 2024-11-17 RX ORDER — ONDANSETRON HYDROCHLORIDE 2 MG/ML
4 INJECTION, SOLUTION INTRAMUSCULAR; INTRAVENOUS ONCE
Refills: 0 | Status: COMPLETED | OUTPATIENT
Start: 2024-11-17 | End: 2024-11-17

## 2024-11-17 RX ORDER — ACETAMINOPHEN 500 MG
1000 TABLET ORAL ONCE
Refills: 0 | Status: COMPLETED | OUTPATIENT
Start: 2024-11-17 | End: 2024-11-17

## 2024-11-17 RX ADMIN — SODIUM CHLORIDE 1000 MILLILITER(S): 9 INJECTION, SOLUTION INTRAMUSCULAR; INTRAVENOUS; SUBCUTANEOUS at 21:49

## 2024-11-17 RX ADMIN — Medication 400 MILLIGRAM(S): at 21:49

## 2024-11-17 RX ADMIN — SODIUM CHLORIDE 1000 MILLILITER(S): 9 INJECTION, SOLUTION INTRAMUSCULAR; INTRAVENOUS; SUBCUTANEOUS at 20:05

## 2024-11-17 RX ADMIN — ONDANSETRON HYDROCHLORIDE 4 MILLIGRAM(S): 2 INJECTION, SOLUTION INTRAMUSCULAR; INTRAVENOUS at 21:48

## 2024-11-17 NOTE — ED ADULT NURSE NOTE - OBJECTIVE STATEMENT
pt c/o mid abdominal pain that started this morning. pt also c/o nausea, vomiting and diarrhea. pt denies fever. pt states she is unable to keep down food or fluids.

## 2024-11-17 NOTE — ED PROVIDER NOTE - PATIENT PORTAL LINK FT
You can access the FollowMyHealth Patient Portal offered by [facility] by registering at the following website: [email or web address]. By joining [employer]’s FollowMyHealth portal, you will also be able to view your health information using other applications (apps) compatible with our system.

## 2024-11-17 NOTE — ED PROVIDER NOTE - CLINICAL SUMMARY MEDICAL DECISION MAKING FREE TEXT BOX
34-year-old female with no known past medical history coming in with abdominal pain, nausea, vomiting, diarrhea.  No chest pain, difficulty breathing.  History of cholecystectomy.  Unable to tolerate p.o.  Patient is nontoxic-appearing.  No distress.  Diffuse tenderness to palpation.  Differential diagnoses include but not limited to electrolyte abnormalities, anemia,gastroenteritis, appendicitis.      Patient reports her symptoms are completely resolved.  She tolerated p.o.  Patient rather go home at this time and return if her symptoms recur.  Strict return precautions are discussed all questions answered.

## 2024-11-17 NOTE — ED ADULT NURSE NOTE - NSFALLUNIVINTERV_ED_ALL_ED
Bed/Stretcher in lowest position, wheels locked, appropriate side rails in place/Call bell, personal items and telephone in reach/Instruct patient to call for assistance before getting out of bed/chair/stretcher/Non-slip footwear applied when patient is off stretcher/[place] to call system/Physically safe environment - no spills, clutter or unnecessary equipment/Purposeful proactive rounding/Room/bathroom lighting operational, light cord in reach (0) independent

## 2024-11-20 LAB
-  AMOXICILLIN/CLAVULANIC ACID: SIGNIFICANT CHANGE UP
-  AMPICILLIN/SULBACTAM: SIGNIFICANT CHANGE UP
-  AMPICILLIN: SIGNIFICANT CHANGE UP
-  AZTREONAM: SIGNIFICANT CHANGE UP
-  CEFAZOLIN: SIGNIFICANT CHANGE UP
-  CEFEPIME: SIGNIFICANT CHANGE UP
-  CEFOXITIN: SIGNIFICANT CHANGE UP
-  CEFTRIAXONE: SIGNIFICANT CHANGE UP
-  CEFUROXIME: SIGNIFICANT CHANGE UP
-  CIPROFLOXACIN: SIGNIFICANT CHANGE UP
-  ERTAPENEM: SIGNIFICANT CHANGE UP
-  GENTAMICIN: SIGNIFICANT CHANGE UP
-  IMIPENEM: SIGNIFICANT CHANGE UP
-  LEVOFLOXACIN: SIGNIFICANT CHANGE UP
-  MEROPENEM: SIGNIFICANT CHANGE UP
-  PIPERACILLIN/TAZOBACTAM: SIGNIFICANT CHANGE UP
-  TOBRAMYCIN: SIGNIFICANT CHANGE UP
-  TRIMETHOPRIM/SULFAMETHOXAZOLE: SIGNIFICANT CHANGE UP
CULTURE RESULTS: ABNORMAL
METHOD TYPE: SIGNIFICANT CHANGE UP
ORGANISM # SPEC MICROSCOPIC CNT: ABNORMAL
ORGANISM # SPEC MICROSCOPIC CNT: ABNORMAL
SPECIMEN SOURCE: SIGNIFICANT CHANGE UP